# Patient Record
Sex: FEMALE | Race: BLACK OR AFRICAN AMERICAN | NOT HISPANIC OR LATINO | Employment: FULL TIME | URBAN - METROPOLITAN AREA
[De-identification: names, ages, dates, MRNs, and addresses within clinical notes are randomized per-mention and may not be internally consistent; named-entity substitution may affect disease eponyms.]

---

## 2017-01-23 ENCOUNTER — TELEPHONE (OUTPATIENT)
Dept: OBSTETRICS AND GYNECOLOGY | Facility: CLINIC | Age: 37
End: 2017-01-23

## 2017-01-23 NOTE — TELEPHONE ENCOUNTER
----- Message from Traci Byers sent at 1/23/2017  2:08 PM CST -----  Contact: Patient  X _1st Request  _  2nd Request  _  3rd Request    Who:LOUIE MCQUEEN [[id number]]    Why:Patient call to see if she can get her appointment switched to tomorrow     What Number to Call Back:Patient can reached at [phone] she will be there until 4:30 if not call [phone]    When to Expect a call back: (Before the end of the day)   -- if call after 3:00 call back will be tomorrow.

## 2017-01-25 ENCOUNTER — OFFICE VISIT (OUTPATIENT)
Dept: OBSTETRICS AND GYNECOLOGY | Facility: CLINIC | Age: 37
End: 2017-01-25
Payer: COMMERCIAL

## 2017-01-25 VITALS
SYSTOLIC BLOOD PRESSURE: 144 MMHG | BODY MASS INDEX: 40.66 KG/M2 | DIASTOLIC BLOOD PRESSURE: 102 MMHG | WEIGHT: 259.06 LBS | HEIGHT: 67 IN

## 2017-01-25 DIAGNOSIS — E66.9 OBESITY, UNSPECIFIED OBESITY SEVERITY, UNSPECIFIED OBESITY TYPE: ICD-10-CM

## 2017-01-25 DIAGNOSIS — E11.9 TYPE 2 DIABETES MELLITUS WITHOUT COMPLICATION, WITH LONG-TERM CURRENT USE OF INSULIN: ICD-10-CM

## 2017-01-25 DIAGNOSIS — I10 ESSENTIAL HYPERTENSION: Primary | ICD-10-CM

## 2017-01-25 DIAGNOSIS — N92.0 MENORRHAGIA WITH REGULAR CYCLE: ICD-10-CM

## 2017-01-25 DIAGNOSIS — Z79.4 TYPE 2 DIABETES MELLITUS WITHOUT COMPLICATION, WITH LONG-TERM CURRENT USE OF INSULIN: ICD-10-CM

## 2017-01-25 PROCEDURE — 99999 PR PBB SHADOW E&M-EST. PATIENT-LVL II: CPT | Mod: PBBFAC,,, | Performed by: OBSTETRICS & GYNECOLOGY

## 2017-01-25 PROCEDURE — 99212 OFFICE O/P EST SF 10 MIN: CPT | Mod: PBBFAC | Performed by: OBSTETRICS & GYNECOLOGY

## 2017-01-25 PROCEDURE — 99212 OFFICE O/P EST SF 10 MIN: CPT | Mod: S$GLB,,, | Performed by: OBSTETRICS & GYNECOLOGY

## 2017-01-25 RX ORDER — MEDROXYPROGESTERONE ACETATE 10 MG/1
10 TABLET ORAL DAILY
Qty: 36 TABLET | Refills: 1 | Status: SHIPPED | OUTPATIENT
Start: 2017-01-25 | End: 2017-08-22

## 2017-01-25 NOTE — PROGRESS NOTES
HISTORY OF PRESENT ILLNESS:    Dee Felton is a 36 y.o. female, , No LMP recorded.,  presents for a follow up.     Patient reports long history of menorrhagia, last seen here by use in . Patient with history of uterine fibroids. Seen at outside MD office, no intervention recalled.   Patient reports 7 day cycles once a month using 6-8 pads per day with clotting. No BTB, some dysmenorrhea.   Patient also with intermittent vaginal discharge, self treated for yeast several times this year.   , considering in vitro in the future. Seen by NALLELY. Patient with history of BTL.      FINAL PATHOLOGIC DIAGNOSIS  ENDOMETRIUM (BIOPSY):  No hyperplasia, no malignancy  Proliferative phase endometrium     Narrative   Comparison: Pelvic ultrasound 8/17/10.      Technique: Transabdominal and transvaginal sonographic evaluation of pelvic organs was performed including grayscale, color flow and spectral technique.    Findings:    The uterus measures 10.0 x 7.8 x 7.3 cm and the endometrial stripe is uniform in thickness measuring 2 mm.  There is a prominent pedunculated, heterogeneous solid mass within the posterior uterine wall compatible with a subserosal uterine fibroid, measuring 6.0 x 5.0 x 5.7 cm, increased in size from 4.2 x 2.5 x 2.7 centimeters on prior study.  There is an additional 2.1 cm subserosal fibroid within the anterior wall.    The right ovary measures 3.6 x 1.9 x 3.0 cm with normal blood flow.  There is a 1.7 cm cyst within the right ovary.    The left ovary measures 2.9 x 1.9 x 2.4 cm with normal blood flow.    There is no free pelvic fluid.   Impression       Enlarging 6.0 cm subserosal fibroid within the posterior uterine wall.  Additional 2.1 cm subserosal fibroid within the anterior wall.    Notification system activated.  ______________________________________     Electronically signed by resident: LAURE MEDINA MD  Date: 10/17/16  Time: 09:08               Past Medical History  "  Diagnosis Date    Anemia     Diabetes mellitus     Hypertension     Sickle cell trait        Past Surgical History   Procedure Laterality Date    Tubal ligation       MEDICATIONS AND ALLERGIES:      Current Outpatient Prescriptions:     amlodipine-benazepril 10-20mg (LOTREL) 10-20 mg per capsule, , Disp: , Rfl:     glimepiride (AMARYL) 4 MG tablet, , Disp: , Rfl:     hydrochlorothiazide (HYDRODIURIL) 25 MG tablet, , Disp: , Rfl:     metformin (GLUCOPHAGE) 500 MG tablet, , Disp: , Rfl:     PEN NEEDLE 31 gauge x 1/4" Ndle, , Disp: , Rfl:     Review of patient's allergies indicates:   Allergen Reactions    Pcn [penicillins] Hives       Family History   Problem Relation Age of Onset    Diabetes Paternal Grandmother     Diabetes Father     Diabetes Mother     Hypertension Mother        Social History     Social History    Marital status:      Spouse name: N/A    Number of children: N/A    Years of education: N/A     Occupational History    Not on file.     Social History Main Topics    Smoking status: Never Smoker    Smokeless tobacco: Not on file    Alcohol use Yes    Drug use: No    Sexual activity: Yes     Partners: Male     Birth control/ protection: None     Other Topics Concern    Not on file     Social History Narrative       COMPREHENSIVE GYN HISTORY:  PAP History: Denies abnormal Paps.  Infection History: Denies STDs. Denies PID.  Benign History: Denies uterine fibroids. Denies ovarian cysts. Denies endometriosis. Denies other conditions.  Cancer History: Denies cervical cancer. Denies uterine cancer or hyperplasia. Denies ovarian cancer. Denies vulvar cancer or pre-cancer. Denies vaginal cancer or pre-cancer. Denies breast cancer. Denies colon cancer.  Sexual Activity History: Reports currently being sexually active  Menstrual History: Monthly. Mod then light flow.   Dysmenorrhea History: Reports mild dysmenorrhea.     ROS:  GENERAL: No weight changes. No swelling. No fatigue. " "No fever.  CARDIOVASCULAR: No chest pain. No shortness of breath. No leg cramps.   NEUROLOGICAL: No headaches. No vision changes.  BREASTS: No pain. No lumps. No discharge.  ABDOMEN: No pain. No nausea. No vomiting. No diarrhea. No constipation.  REPRODUCTIVE: No abnormal bleeding.   VULVA: No pain. No lesions. No itching.  VAGINA: No relaxation. No itching. No odor. No discharge. No lesions.  URINARY: No incontinence. No nocturia. No frequency. No dysuria.    Visit Vitals    BP (!) 144/102    Ht 5' 7" (1.702 m)    Wt 117.5 kg (259 lb 0.7 oz)    BMI 40.57 kg/m2       PE:  APPEARANCE: Well nourished, well developed, in no acute distress.  AFFECT: WNL, alert and oriented x 3.  Deferred    DIAGNOSIS:  1. Essential hypertension    2. Type 2 diabetes mellitus without complication, with long-term current use of insulin    3. Obesity, unspecified obesity severity, unspecified obesity type      Patient counseled in detail on options available for menorrhagia and uterine fibroids. Medical therapy and surgical options discussed in detail.  Will proceed with cyclic Provera at this time.      PLAN: Strict precautions given.   RTC in 3-4 months      "

## 2017-03-01 ENCOUNTER — OFFICE VISIT (OUTPATIENT)
Dept: OBSTETRICS AND GYNECOLOGY | Facility: CLINIC | Age: 37
End: 2017-03-01
Payer: MEDICAID

## 2017-03-01 VITALS
HEIGHT: 67 IN | SYSTOLIC BLOOD PRESSURE: 160 MMHG | WEIGHT: 259 LBS | DIASTOLIC BLOOD PRESSURE: 110 MMHG | BODY MASS INDEX: 40.65 KG/M2

## 2017-03-01 DIAGNOSIS — Z01.30 BP CHECK: ICD-10-CM

## 2017-03-01 DIAGNOSIS — I10 ESSENTIAL HYPERTENSION: Primary | ICD-10-CM

## 2017-03-01 PROCEDURE — 99999 PR PBB SHADOW E&M-EST. PATIENT-LVL III: CPT | Mod: PBBFAC,,, | Performed by: NURSE PRACTITIONER

## 2017-03-01 PROCEDURE — 99213 OFFICE O/P EST LOW 20 MIN: CPT | Mod: PBBFAC | Performed by: NURSE PRACTITIONER

## 2017-03-01 PROCEDURE — 99213 OFFICE O/P EST LOW 20 MIN: CPT | Mod: S$PBB,,, | Performed by: NURSE PRACTITIONER

## 2017-03-01 NOTE — PROGRESS NOTES
CC: BP check    HPI: Pt is a 36 y.o.  female who presents for a BP check as a f/u from her visit with Dr. Moscoso on 1/25/17. BP at last visit was 144/102-pt had denied having any elevated BPs before. Pt was started on HCTZ 25 mg. Pt states she did not take her medicine today. Denies chest pain, SOB, and headaches. No other problems. Pt does have a PCP at [facility] and has an appt with her for next week.     ROS:  GENERAL: Feeling well overall. Denies fever or chills.   SKIN: Denies rash or lesions.   HEAD: Denies head injury or headache.   NODES: Denies enlarged lymph nodes.   CHEST: Denies chest pain or shortness of breath.   CARDIOVASCULAR: Denies palpitations or left sided chest pain.   ABDOMEN: No abdominal pain, constipation, diarrhea, nausea, vomiting or rectal bleeding.   URINARY: No dysuria, hematuria, or burning on urination.  REPRODUCTIVE: See HPI.   BREASTS: Denies pain, lumps, or nipple discharge.   HEMATOLOGIC: No easy bruisability or excessive bleeding.   MUSCULOSKELETAL: Denies joint pain or swelling.   NEUROLOGIC: Denies syncope or weakness.   PSYCHIATRIC: Denies depression, anxiety or mood swings.    PE:   APPEARANCE: Well nourished, well developed, Black or  female in no acute distress.  PELVIC: deferred    Diagnosis:  1. Essential hypertension    2. BP check        Plan:   BP today 160/110-encouraged to take her medicine today.   Told she needs to f/u with PCP to get HTN under control-pt has appt scheduled for next week  BP precautions-go to ED with headaches, vision changes, and chest pain.     Follow-up with PCP for BP

## 2017-04-20 ENCOUNTER — TELEPHONE (OUTPATIENT)
Dept: OBSTETRICS AND GYNECOLOGY | Facility: CLINIC | Age: 37
End: 2017-04-20

## 2017-04-20 NOTE — TELEPHONE ENCOUNTER
----- Message from Radha Rodrigues sent at 4/20/2017 12:34 PM CDT -----  Contact: Self  X  1st Request  _  2nd Request  _  3rd Request        Who:  LOUIE MCQUEEN [[id number]]    Why: Pt is calling to discuss her next steps with having surgery and would like to know if she can be seen sooner than June.  Pt was advised that there wasn't anything that I could get her scheduled for.  Please contact pt to further discuss and advise.    What Number to Call Back: [phone]    When to Expect a call back: (Before the end of the day)   -- if the call is after 12:00, the call back will be tomorrow.

## 2017-06-21 ENCOUNTER — LAB VISIT (OUTPATIENT)
Dept: LAB | Facility: OTHER | Age: 37
End: 2017-06-21
Attending: OBSTETRICS & GYNECOLOGY
Payer: MEDICAID

## 2017-06-21 ENCOUNTER — OFFICE VISIT (OUTPATIENT)
Dept: OBSTETRICS AND GYNECOLOGY | Facility: CLINIC | Age: 37
End: 2017-06-21
Payer: MEDICAID

## 2017-06-21 VITALS
BODY MASS INDEX: 38.76 KG/M2 | DIASTOLIC BLOOD PRESSURE: 100 MMHG | WEIGHT: 246.94 LBS | HEIGHT: 67 IN | SYSTOLIC BLOOD PRESSURE: 130 MMHG

## 2017-06-21 DIAGNOSIS — D25.9 UTERINE LEIOMYOMA, UNSPECIFIED LOCATION: ICD-10-CM

## 2017-06-21 DIAGNOSIS — E66.9 OBESITY, UNSPECIFIED OBESITY SEVERITY, UNSPECIFIED OBESITY TYPE: ICD-10-CM

## 2017-06-21 DIAGNOSIS — N89.8 VAGINAL IRRITATION: ICD-10-CM

## 2017-06-21 DIAGNOSIS — Z79.4 TYPE 2 DIABETES MELLITUS WITHOUT COMPLICATION, WITH LONG-TERM CURRENT USE OF INSULIN: ICD-10-CM

## 2017-06-21 DIAGNOSIS — N92.0 MENORRHAGIA WITH REGULAR CYCLE: ICD-10-CM

## 2017-06-21 DIAGNOSIS — B37.2 YEAST INFECTION OF THE SKIN: ICD-10-CM

## 2017-06-21 DIAGNOSIS — E11.9 TYPE 2 DIABETES MELLITUS WITHOUT COMPLICATION, WITH LONG-TERM CURRENT USE OF INSULIN: ICD-10-CM

## 2017-06-21 DIAGNOSIS — Z86.2 HISTORY OF ANEMIA: ICD-10-CM

## 2017-06-21 DIAGNOSIS — I10 ESSENTIAL HYPERTENSION: Primary | ICD-10-CM

## 2017-06-21 DIAGNOSIS — Z01.419 ENCOUNTER FOR GYNECOLOGICAL EXAMINATION WITHOUT ABNORMAL FINDING: ICD-10-CM

## 2017-06-21 LAB
ANISOCYTOSIS BLD QL SMEAR: ABNORMAL
BASOPHILS # BLD AUTO: 0.01 K/UL
BASOPHILS NFR BLD: 0.2 %
DIFFERENTIAL METHOD: ABNORMAL
EOSINOPHIL # BLD AUTO: 0.1 K/UL
EOSINOPHIL NFR BLD: 0.9 %
ERYTHROCYTE [DISTWIDTH] IN BLOOD BY AUTOMATED COUNT: 18.2 %
ESTIMATED AVG GLUCOSE: 283 MG/DL
HBA1C MFR BLD HPLC: 11.5 %
HCT VFR BLD AUTO: 25.4 %
HGB BLD-MCNC: 7.8 G/DL
HYPOCHROMIA BLD QL SMEAR: ABNORMAL
LYMPHOCYTES # BLD AUTO: 2 K/UL
LYMPHOCYTES NFR BLD: 31 %
MCH RBC QN AUTO: 19.6 PG
MCHC RBC AUTO-ENTMCNC: 30.7 %
MCV RBC AUTO: 64 FL
MONOCYTES # BLD AUTO: 0.5 K/UL
MONOCYTES NFR BLD: 7.9 %
NEUTROPHILS # BLD AUTO: 3.9 K/UL
NEUTROPHILS NFR BLD: 60 %
OVALOCYTES BLD QL SMEAR: ABNORMAL
PLATELET # BLD AUTO: 482 K/UL
PLATELET BLD QL SMEAR: ABNORMAL
PMV BLD AUTO: 9.5 FL
POIKILOCYTOSIS BLD QL SMEAR: SLIGHT
POLYCHROMASIA BLD QL SMEAR: ABNORMAL
RBC # BLD AUTO: 3.97 M/UL
SCHISTOCYTES BLD QL SMEAR: PRESENT
T4 FREE SERPL-MCNC: 0.94 NG/DL
TARGETS BLD QL SMEAR: ABNORMAL
TSH SERPL DL<=0.005 MIU/L-ACNC: 1.1 UIU/ML
WBC # BLD AUTO: 6.48 K/UL

## 2017-06-21 PROCEDURE — 4010F ACE/ARB THERAPY RXD/TAKEN: CPT | Mod: ,,, | Performed by: OBSTETRICS & GYNECOLOGY

## 2017-06-21 PROCEDURE — 85025 COMPLETE CBC W/AUTO DIFF WBC: CPT

## 2017-06-21 PROCEDURE — 84439 ASSAY OF FREE THYROXINE: CPT

## 2017-06-21 PROCEDURE — 99999 PR PBB SHADOW E&M-EST. PATIENT-LVL III: CPT | Mod: PBBFAC,,, | Performed by: OBSTETRICS & GYNECOLOGY

## 2017-06-21 PROCEDURE — 36415 COLL VENOUS BLD VENIPUNCTURE: CPT

## 2017-06-21 PROCEDURE — 99212 OFFICE O/P EST SF 10 MIN: CPT | Mod: S$PBB,,, | Performed by: OBSTETRICS & GYNECOLOGY

## 2017-06-21 PROCEDURE — 83036 HEMOGLOBIN GLYCOSYLATED A1C: CPT

## 2017-06-21 PROCEDURE — 84443 ASSAY THYROID STIM HORMONE: CPT

## 2017-06-21 RX ORDER — NYSTATIN 100000 [USP'U]/G
POWDER TOPICAL
Qty: 56.7 G | Refills: 0 | Status: SHIPPED | OUTPATIENT
Start: 2017-06-21 | End: 2017-08-22

## 2017-06-21 RX ORDER — NYSTATIN 100000 U/G
CREAM TOPICAL 2 TIMES DAILY
Qty: 1 TUBE | Refills: 1 | Status: SHIPPED | OUTPATIENT
Start: 2017-06-21 | End: 2017-07-05

## 2017-06-21 NOTE — PATIENT INSTRUCTIONS
Dr. Bernadette No-May   [facility]     [facility], located on the [facility], provides primary and preventive health care for children, adults and seniors.       We offer convenient access to several health services located under one roof, including:   [facility]   Dental   Behavioral health  Hours of operation:   Monday - Friday, 8 a.m. to 5 p.m.  [facility].  Call today for an appointment: [phone]

## 2017-06-21 NOTE — PROGRESS NOTES
HISTORY OF PRESENT ILLNESS:    Dee Felton is a 36 y.o. female  Patient's last menstrual period was 2017. presents today  For follow up.     Patient reports long history of menorrhagia, last seen here by use in . Patient with history of uterine fibroids. Seen at outside MD office, no intervention recalled.   Patient reports 7 day cycles once a month using 6-8 pads per day with clotting. No BTB, some dysmenorrhea.   Patient also with intermittent vaginal discharge, self treated for yeast several times this year.   , considering in vitro in the future. Seen by NALLELY. Patient with history of BTL.      FINAL PATHOLOGIC DIAGNOSIS  ENDOMETRIUM (BIOPSY):  No hyperplasia, no malignancy  Proliferative phase endometrium     Narrative   Comparison: Pelvic ultrasound 8/17/10.      Technique: Transabdominal and transvaginal sonographic evaluation of pelvic organs was performed including grayscale, color flow and spectral technique.    Findings:    The uterus measures 10.0 x 7.8 x 7.3 cm and the endometrial stripe is uniform in thickness measuring 2 mm.  There is a prominent pedunculated, heterogeneous solid mass within the posterior uterine wall compatible with a subserosal uterine fibroid, measuring 6.0 x 5.0 x 5.7 cm, increased in size from 4.2 x 2.5 x 2.7 centimeters on prior study.  There is an additional 2.1 cm subserosal fibroid within the anterior wall.    The right ovary measures 3.6 x 1.9 x 3.0 cm with normal blood flow.  There is a 1.7 cm cyst within the right ovary.    The left ovary measures 2.9 x 1.9 x 2.4 cm with normal blood flow.    There is no free pelvic fluid.   Impression       Enlarging 6.0 cm subserosal fibroid within the posterior uterine wall.  Additional 2.1 cm subserosal fibroid within the anterior wall.    Notification system activated.  ______________________________________     Electronically signed by resident: LAURE MEDINA MD  Date: 10/17/16  Time: 09:08      "      Patient seen in January, on cyclic Provera since then.   Cycles are still heavy, no improvement.     Past Medical History:   Diagnosis Date    Anemia     Diabetes mellitus     Hypertension     Sickle cell trait        Past Surgical History:   Procedure Laterality Date    TUBAL LIGATION         MEDICATIONS AND ALLERGIES:      Current Outpatient Prescriptions:     amlodipine-benazepril 10-20mg (LOTREL) 10-20 mg per capsule, , Disp: , Rfl:     glimepiride (AMARYL) 4 MG tablet, , Disp: , Rfl:     hydrochlorothiazide (HYDRODIURIL) 25 MG tablet, , Disp: , Rfl:     metformin (GLUCOPHAGE) 500 MG tablet, , Disp: , Rfl:     PEN NEEDLE 31 gauge x 1/4" Ndle, , Disp: , Rfl:     medroxyPROGESTERone (PROVERA) 10 MG tablet, Take 1 tablet (10 mg total) by mouth once daily. Take one pill per day on cycle days # 14-25, Disp: 36 tablet, Rfl: 1    Review of patient's allergies indicates:   Allergen Reactions    Pcn [penicillins] Hives       COMPREHENSIVE GYN HISTORY:  PAP History: Denies abnormal Paps.  Infection History: Denies STDs. Denies PID.  Benign History: Denies uterine fibroids. Denies ovarian cysts. Denies endometriosis. Denies other conditions.  Cancer History: Denies cervical cancer. Denies uterine cancer or hyperplasia. Denies ovarian cancer. Denies vulvar cancer or pre-cancer. Denies vaginal cancer or pre-cancer. Denies breast cancer. Denies colon cancer.  Sexual Activity History: Reports currently being sexually active  Menstrual History: Every 28 days, flows for 4 days. Light flow.  Dysmenorrhea History: Denies dysmenorrhea.    ROS:  GENERAL: No fever or chills.  BREASTS: No pain. No lumps. No discharge.  ABDOMEN: No pain. No nausea. No vomiting. No diarrhea. No constipation.  REPRODUCTIVE: No abnormal bleeding.   VULVA: No pain. No lesions. No itching.  VAGINA: No relaxation. No itching. No odor. No discharge. No lesions.  URINARY: No incontinence. No nocturia. No frequency. No " dysuria.    PE:  APPEARANCE: Well nourished, well developed, in no acute distress.  AFFECT: WNL, alert and oriented x 3.  ABDOMEN: Soft. No tenderness or masses. No hepatosplenomegaly. No hernias.  BREASTS: Symmetrical, no skin changes or visible lesions. No palpable masses, nipple discharge bilaterally.  PELVIC: Normal external female genitalia without lesions. Normal hair distribution. Adequate perineal body, normal urethral meatus. Vagina pink and well rugated without lesions or discharge. Cervix pink without lesions, discharge or tenderness. No significant cystocele or rectocele. Bimanual exam shows uterus to be 4-6 weeks size, regular, mobile and nontender. Adnexa without masses or tenderness.    PROCEDURES:    1. Essential hypertension    2. Type 2 diabetes mellitus without complication, with long-term current use of insulin    3. Obesity, unspecified obesity severity, unspecified obesity type    4. Encounter for gynecological examination without abnormal finding        PLAN:         COUNSELING:  The patient was counseled today on:    FOLLOW-UP with me pending test results.

## 2017-06-22 LAB
C TRACH DNA SPEC QL NAA+PROBE: NOT DETECTED
CANDIDA RRNA VAG QL PROBE: NEGATIVE
G VAGINALIS RRNA GENITAL QL PROBE: POSITIVE
N GONORRHOEA DNA SPEC QL NAA+PROBE: NOT DETECTED
T VAGINALIS RRNA GENITAL QL PROBE: NEGATIVE

## 2017-06-27 ENCOUNTER — TELEPHONE (OUTPATIENT)
Dept: OBSTETRICS AND GYNECOLOGY | Facility: CLINIC | Age: 37
End: 2017-06-27

## 2017-06-27 DIAGNOSIS — N76.0 BV (BACTERIAL VAGINOSIS): Primary | ICD-10-CM

## 2017-06-27 DIAGNOSIS — B96.89 BV (BACTERIAL VAGINOSIS): Primary | ICD-10-CM

## 2017-06-27 RX ORDER — METRONIDAZOLE 7.5 MG/G
1 GEL VAGINAL DAILY
Qty: 1 G | Refills: 0 | Status: SHIPPED | OUTPATIENT
Start: 2017-06-27 | End: 2017-07-04

## 2017-08-22 ENCOUNTER — LAB VISIT (OUTPATIENT)
Dept: LAB | Facility: OTHER | Age: 37
End: 2017-08-22
Attending: OBSTETRICS & GYNECOLOGY
Payer: MEDICAID

## 2017-08-22 ENCOUNTER — OFFICE VISIT (OUTPATIENT)
Dept: OBSTETRICS AND GYNECOLOGY | Facility: CLINIC | Age: 37
End: 2017-08-22
Payer: MEDICAID

## 2017-08-22 VITALS
WEIGHT: 247.38 LBS | SYSTOLIC BLOOD PRESSURE: 142 MMHG | DIASTOLIC BLOOD PRESSURE: 94 MMHG | HEIGHT: 67 IN | BODY MASS INDEX: 38.83 KG/M2

## 2017-08-22 DIAGNOSIS — I10 ESSENTIAL HYPERTENSION: ICD-10-CM

## 2017-08-22 DIAGNOSIS — E11.9 TYPE 2 DIABETES MELLITUS WITHOUT COMPLICATION, WITH LONG-TERM CURRENT USE OF INSULIN: ICD-10-CM

## 2017-08-22 DIAGNOSIS — N92.0 MENORRHAGIA WITH REGULAR CYCLE: ICD-10-CM

## 2017-08-22 DIAGNOSIS — Z79.4 TYPE 2 DIABETES MELLITUS WITHOUT COMPLICATION, WITH LONG-TERM CURRENT USE OF INSULIN: ICD-10-CM

## 2017-08-22 DIAGNOSIS — N92.0 MENORRHAGIA WITH REGULAR CYCLE: Primary | ICD-10-CM

## 2017-08-22 DIAGNOSIS — E66.9 OBESITY, UNSPECIFIED OBESITY SEVERITY, UNSPECIFIED OBESITY TYPE: ICD-10-CM

## 2017-08-22 LAB
ANISOCYTOSIS BLD QL SMEAR: SLIGHT
B-HCG UR QL: NEGATIVE
BASOPHILS # BLD AUTO: 0.02 K/UL
BASOPHILS NFR BLD: 0.3 %
CTP QC/QA: YES
DACRYOCYTES BLD QL SMEAR: ABNORMAL
DIFFERENTIAL METHOD: ABNORMAL
EOSINOPHIL # BLD AUTO: 0.1 K/UL
EOSINOPHIL NFR BLD: 1.6 %
ERYTHROCYTE [DISTWIDTH] IN BLOOD BY AUTOMATED COUNT: 18.8 %
GIANT PLATELETS BLD QL SMEAR: PRESENT
HCT VFR BLD AUTO: 25.8 %
HGB BLD-MCNC: 7.8 G/DL
HYPOCHROMIA BLD QL SMEAR: ABNORMAL
LYMPHOCYTES # BLD AUTO: 2.2 K/UL
LYMPHOCYTES NFR BLD: 31.4 %
MCH RBC QN AUTO: 19 PG
MCHC RBC AUTO-ENTMCNC: 30.2 G/DL
MCV RBC AUTO: 63 FL
MONOCYTES # BLD AUTO: 0.4 K/UL
MONOCYTES NFR BLD: 6.3 %
NEUTROPHILS # BLD AUTO: 4.2 K/UL
NEUTROPHILS NFR BLD: 60.4 %
PLATELET # BLD AUTO: 479 K/UL
PLATELET BLD QL SMEAR: ABNORMAL
PMV BLD AUTO: 9.8 FL
POIKILOCYTOSIS BLD QL SMEAR: SLIGHT
POLYCHROMASIA BLD QL SMEAR: ABNORMAL
RBC # BLD AUTO: 4.11 M/UL
SCHISTOCYTES BLD QL SMEAR: ABNORMAL
SCHISTOCYTES BLD QL SMEAR: PRESENT
T4 FREE SERPL-MCNC: 0.98 NG/DL
TSH SERPL DL<=0.005 MIU/L-ACNC: 0.87 UIU/ML
WBC # BLD AUTO: 6.88 K/UL

## 2017-08-22 PROCEDURE — 81025 URINE PREGNANCY TEST: CPT | Mod: PBBFAC | Performed by: OBSTETRICS & GYNECOLOGY

## 2017-08-22 PROCEDURE — 85025 COMPLETE CBC W/AUTO DIFF WBC: CPT

## 2017-08-22 PROCEDURE — 3008F BODY MASS INDEX DOCD: CPT | Mod: ,,, | Performed by: OBSTETRICS & GYNECOLOGY

## 2017-08-22 PROCEDURE — 4010F ACE/ARB THERAPY RXD/TAKEN: CPT | Mod: ,,, | Performed by: OBSTETRICS & GYNECOLOGY

## 2017-08-22 PROCEDURE — 99999 PR PBB SHADOW E&M-EST. PATIENT-LVL II: CPT | Mod: PBBFAC,,, | Performed by: OBSTETRICS & GYNECOLOGY

## 2017-08-22 PROCEDURE — 36415 COLL VENOUS BLD VENIPUNCTURE: CPT

## 2017-08-22 PROCEDURE — 84439 ASSAY OF FREE THYROXINE: CPT

## 2017-08-22 PROCEDURE — 3077F SYST BP >= 140 MM HG: CPT | Mod: ,,, | Performed by: OBSTETRICS & GYNECOLOGY

## 2017-08-22 PROCEDURE — 99213 OFFICE O/P EST LOW 20 MIN: CPT | Mod: S$PBB,,, | Performed by: OBSTETRICS & GYNECOLOGY

## 2017-08-22 PROCEDURE — 3080F DIAST BP >= 90 MM HG: CPT | Mod: ,,, | Performed by: OBSTETRICS & GYNECOLOGY

## 2017-08-22 PROCEDURE — 84443 ASSAY THYROID STIM HORMONE: CPT

## 2017-08-22 PROCEDURE — 3046F HEMOGLOBIN A1C LEVEL >9.0%: CPT | Mod: ,,, | Performed by: OBSTETRICS & GYNECOLOGY

## 2017-08-22 PROCEDURE — 99212 OFFICE O/P EST SF 10 MIN: CPT | Mod: PBBFAC | Performed by: OBSTETRICS & GYNECOLOGY

## 2017-08-22 RX ORDER — METFORMIN HYDROCHLORIDE 500 MG/1
500 TABLET, EXTENDED RELEASE ORAL 2 TIMES DAILY WITH MEALS
COMMUNITY
End: 2018-09-11

## 2017-08-22 RX ORDER — INSULIN GLARGINE 300 [IU]/ML
INJECTION, SOLUTION SUBCUTANEOUS
COMMUNITY
End: 2021-01-28

## 2017-08-22 RX ORDER — GABAPENTIN 300 MG/1
800 CAPSULE ORAL 2 TIMES DAILY
COMMUNITY
End: 2023-02-09

## 2017-08-22 RX ORDER — GLIPIZIDE 10 MG/1
5 TABLET, FILM COATED, EXTENDED RELEASE ORAL
COMMUNITY
End: 2021-01-28

## 2017-08-23 ENCOUNTER — TELEPHONE (OUTPATIENT)
Dept: OBSTETRICS AND GYNECOLOGY | Facility: CLINIC | Age: 37
End: 2017-08-23

## 2017-08-23 NOTE — TELEPHONE ENCOUNTER
----- Message from Norris Lowery sent at 8/23/2017 12:38 PM CDT -----  X_  1st Request  _  2nd Request  _  3rd Request        Who: LOUIE MCQUEEN [[id number]]    Why:  Pt is returning a call from the office regarding her lab work. Please call to discuss.    What Number to Call Back:[phone](until 4:45pm) or [phone]    When to Expect a call back: (With in 24 hours)

## 2017-08-28 NOTE — PROGRESS NOTES
HISTORY OF PRESENT ILLNESS:    Dee Felton is a 36 y.o. female  Patient's last menstrual period was 2017. presents today  For follow up.     Patient reports long history of menorrhagia, last seen here by use in . Patient with history of uterine fibroids. Seen at outside MD office, no intervention recalled.   Patient reports 7 day cycles once a month using 6-8 pads per day with clotting. No BTB, some dysmenorrhea.   Patient also with intermittent vaginal discharge, self treated for yeast several times this year.   , considering in vitro in the future. Seen by NALLELY. Patient with history of BTL.      FINAL PATHOLOGIC DIAGNOSIS  ENDOMETRIUM (BIOPSY):  No hyperplasia, no malignancy  Proliferative phase endometrium     Narrative   Comparison: Pelvic ultrasound 8/17/10.      Technique: Transabdominal and transvaginal sonographic evaluation of pelvic organs was performed including grayscale, color flow and spectral technique.    Findings:    The uterus measures 10.0 x 7.8 x 7.3 cm and the endometrial stripe is uniform in thickness measuring 2 mm.  There is a prominent pedunculated, heterogeneous solid mass within the posterior uterine wall compatible with a subserosal uterine fibroid, measuring 6.0 x 5.0 x 5.7 cm, increased in size from 4.2 x 2.5 x 2.7 centimeters on prior study.  There is an additional 2.1 cm subserosal fibroid within the anterior wall.    The right ovary measures 3.6 x 1.9 x 3.0 cm with normal blood flow.  There is a 1.7 cm cyst within the right ovary.    The left ovary measures 2.9 x 1.9 x 2.4 cm with normal blood flow.    There is no free pelvic fluid.   Impression       Enlarging 6.0 cm subserosal fibroid within the posterior uterine wall.  Additional 2.1 cm subserosal fibroid within the anterior wall.    Notification system activated.  ______________________________________     Electronically signed by resident: LAURE MEDINA MD  Date: 10/17/16  Time: 09:08      "      Patient seen in January, on cyclic Provera since then.   Cycles are still heavy, no improvement.     TODAY:  PATIENT SEEN FOR FOLLOW UP. PATIENT DESIRES MYOMECTOMY.     Past Medical History:   Diagnosis Date    Anemia     Diabetes mellitus     Hypertension     Sickle cell trait        Past Surgical History:   Procedure Laterality Date    TUBAL LIGATION         MEDICATIONS AND ALLERGIES:      Current Outpatient Prescriptions:     amlodipine-benazepril 10-20mg (LOTREL) 10-20 mg per capsule, , Disp: , Rfl:     gabapentin (NEURONTIN) 300 MG capsule, Take 300 mg by mouth 3 (three) times daily., Disp: , Rfl:     glipiZIDE (GLUCOTROL) 10 MG TR24, Take 5 mg by mouth daily with breakfast., Disp: , Rfl:     hydrochlorothiazide (HYDRODIURIL) 25 MG tablet, , Disp: , Rfl:     insulin glargine, TOUJEO, (TOUJEO) 300 unit/mL (1.5 mL) InPn pen, Inject into the skin., Disp: , Rfl:     metformin (GLUCOPHAGE-XR) 500 MG 24 hr tablet, Take 500 mg by mouth 2 (two) times daily with meals., Disp: , Rfl:     PEN NEEDLE 31 gauge x 1/4" Ndle, , Disp: , Rfl:     Review of patient's allergies indicates:   Allergen Reactions    Pcn [penicillins] Hives       COMPREHENSIVE GYN HISTORY:  PAP History: Denies abnormal Paps.  Infection History: Denies STDs. Denies PID.  Benign History: Denies uterine fibroids. Denies ovarian cysts. Denies endometriosis. Denies other conditions.  Cancer History: Denies cervical cancer. Denies uterine cancer or hyperplasia. Denies ovarian cancer. Denies vulvar cancer or pre-cancer. Denies vaginal cancer or pre-cancer. Denies breast cancer. Denies colon cancer.  Sexual Activity History: Reports currently being sexually active  Menstrual History: Every 28 days, flows for 4 days. Light flow.  Dysmenorrhea History: Denies dysmenorrhea.    ROS:  GENERAL: No fever or chills.  BREASTS: No pain. No lumps. No discharge.  ABDOMEN: No pain. No nausea. No vomiting. No diarrhea. No constipation.  REPRODUCTIVE: No " abnormal bleeding.   VULVA: No pain. No lesions. No itching.  VAGINA: No relaxation. No itching. No odor. No discharge. No lesions.  URINARY: No incontinence. No nocturia. No frequency. No dysuria.    PE:  APPEARANCE: Well nourished, well developed, in no acute distress.  AFFECT: WNL, alert and oriented x 3.  DEFERRED        1. Menorrhagia with regular cycle    2. Essential hypertension    3. Type 2 diabetes mellitus without complication, with long-term current use of insulin    4. Obesity, unspecified obesity severity, unspecified obesity type        PLAN:    Orders Placed This Encounter    T4, free    TSH    CBC auto differential    POCT urine pregnancy       FOLLOW-UP with me pending test results.

## 2017-10-13 ENCOUNTER — TELEPHONE (OUTPATIENT)
Dept: OBSTETRICS AND GYNECOLOGY | Facility: CLINIC | Age: 37
End: 2017-10-13

## 2017-10-13 NOTE — TELEPHONE ENCOUNTER
I called the pt to schedule an apt for her to come in to discuss her blood work and the pt states that she started a new job and she will have to call us when she can come in and be seen.

## 2017-10-13 NOTE — TELEPHONE ENCOUNTER
Pt was called and she states that she will call the office back to schedule an apt for a follow up due to she started a new job and she cannot take off right now.

## 2017-10-13 NOTE — TELEPHONE ENCOUNTER
----- Message from Melissa Moscoso MD sent at 8/27/2017 11:56 PM CDT -----  PLEASE DISCUSS SURGERY DATE WITH ME ON NEXT WEEK

## 2018-09-11 ENCOUNTER — OFFICE VISIT (OUTPATIENT)
Dept: OBSTETRICS AND GYNECOLOGY | Facility: CLINIC | Age: 38
End: 2018-09-11
Payer: COMMERCIAL

## 2018-09-11 VITALS
WEIGHT: 233.69 LBS | DIASTOLIC BLOOD PRESSURE: 68 MMHG | SYSTOLIC BLOOD PRESSURE: 122 MMHG | BODY MASS INDEX: 36.68 KG/M2 | HEIGHT: 67 IN

## 2018-09-11 DIAGNOSIS — N90.89 VULVAR IRRITATION: Primary | ICD-10-CM

## 2018-09-11 PROCEDURE — 3078F DIAST BP <80 MM HG: CPT | Mod: CPTII,S$GLB,, | Performed by: OBSTETRICS & GYNECOLOGY

## 2018-09-11 PROCEDURE — 99999 PR PBB SHADOW E&M-EST. PATIENT-LVL III: CPT | Mod: PBBFAC,,, | Performed by: OBSTETRICS & GYNECOLOGY

## 2018-09-11 PROCEDURE — 99214 OFFICE O/P EST MOD 30 MIN: CPT | Mod: S$GLB,,, | Performed by: OBSTETRICS & GYNECOLOGY

## 2018-09-11 PROCEDURE — 3008F BODY MASS INDEX DOCD: CPT | Mod: CPTII,S$GLB,, | Performed by: OBSTETRICS & GYNECOLOGY

## 2018-09-11 PROCEDURE — 3074F SYST BP LT 130 MM HG: CPT | Mod: CPTII,S$GLB,, | Performed by: OBSTETRICS & GYNECOLOGY

## 2018-09-11 RX ORDER — FLUCONAZOLE 150 MG/1
150 TABLET ORAL
Qty: 2 TABLET | Refills: 0 | Status: SHIPPED | OUTPATIENT
Start: 2018-09-11 | End: 2018-09-15

## 2018-09-11 RX ORDER — NYSTATIN AND TRIAMCINOLONE ACETONIDE 100000; 1 [USP'U]/G; MG/G
CREAM TOPICAL
Qty: 30 G | Refills: 1 | Status: SHIPPED | OUTPATIENT
Start: 2018-09-11 | End: 2019-01-09 | Stop reason: SDUPTHER

## 2018-09-11 NOTE — LETTER
September 11, 2018               [facility] [address]  Obstetrics and Gynecology  [address]  [facility] [place] [facility] [address]  Phone: [phone]  Fax: [phone]   September 11, 2018     Patient: Dee Felton   YOB: 1980   Date of Visit: 9/11/2018       To Whom it May Concern:    Dee Felton was seen in my clinic on 9/11/2018. She may return to work on 9/12/2018.    If you have any questions or concerns, please don't hesitate to call.    Sincerely,         Melissa Navarrete MA

## 2018-09-11 NOTE — PROGRESS NOTES
"CC: vulvar irritation     Dee Felton is a 37 y.o. female, , Patient's last menstrual period was 2018 (exact date).,  presents for a follow up visit, for above.     Here today for vulvar irritation. Seen in [facility] ED for some sort of allergic reaction/rash per patient report. Not sure what it was in response to. They gave her abx and steroids. Since this time, she has developed intense vulvar itching, irritation and burning. Tried OTC pads and triple antibiotics on the vulva without relief. Of note, she is a diabetic and states her last A1C was 10.     Past Medical History:   Diagnosis Date    Anemia     Diabetes mellitus     Hypertension     Sickle cell trait        Past Surgical History:   Procedure Laterality Date    TUBAL LIGATION       MEDICATIONS AND ALLERGIES:      Current Outpatient Medications:     amlodipine-benazepril 10-20mg (LOTREL) 10-20 mg per capsule, , Disp: , Rfl:     gabapentin (NEURONTIN) 300 MG capsule, Take 300 mg by mouth 3 (three) times daily., Disp: , Rfl:     glipiZIDE (GLUCOTROL) 10 MG TR24, Take 5 mg by mouth daily with breakfast., Disp: , Rfl:     hydrochlorothiazide (HYDRODIURIL) 25 MG tablet, , Disp: , Rfl:     insulin glargine, TOUJEO, (TOUJEO) 300 unit/mL (1.5 mL) InPn pen, Inject into the skin., Disp: , Rfl:     metformin (GLUCOPHAGE-XR) 500 MG 24 hr tablet, Take 500 mg by mouth 2 (two) times daily with meals., Disp: , Rfl:     PEN NEEDLE 31 gauge x 1/4" Ndle, , Disp: , Rfl:     Review of patient's allergies indicates:   Allergen Reactions    Pcn [penicillins] Hives       Family History   Problem Relation Age of Onset    Diabetes Paternal Grandmother     Diabetes Father     Diabetes Mother     Hypertension Mother        Social History     Socioeconomic History    Marital status:      Spouse name: Not on file    Number of children: Not on file    Years of education: Not on file    Highest education level: Not on file   Social " "Needs    Financial resource strain: Not on file    Food insecurity - worry: Not on file    Food insecurity - inability: Not on file    Transportation needs - medical: Not on file    Transportation needs - non-medical: Not on file   Occupational History    Not on file   Tobacco Use    Smoking status: Never Smoker    Smokeless tobacco: Never Used   Substance and Sexual Activity    Alcohol use: Yes    Drug use: No    Sexual activity: Yes     Partners: Male     Birth control/protection: None, See Surgical Hx     Comment: , tubal   Other Topics Concern    Not on file   Social History Narrative    Not on file     ROS:  GENERA: No fatigue. No fever.  CARDIOVASCULAR: No chest pain. No shortness of breath. No leg cramps.   REPRODUCTIVE: No abnormal bleeding, cycles heavy for first three days  VULVA: Severe itching/irritation.    /68   Ht 5' 7" (1.702 m)   Wt 106 kg (233 lb 11 oz)   LMP 09/02/2018 (Exact Date)   BMI 36.60 kg/m²     PE:  APPEARANCE: Well nourished, well developed, in no acute distress.  AFFECT: WNL, alert and oriented x 3.  Pelvic: There is diffuse erythema on vulva bilaterally with satellite lesions, extends inferiorly to perineum. Some excoriation. No lesions, ulcerations or masses. On internal exam, there is scant vaginal discharge, but some erythema at the introitus. Affirm collected.     Assessment/Plan: 36 yo here with vulvar irritation. Diffuse vulvar erythema on exam. Appearance consistent with yeast infection vs contact dermatitis. May be related to recent course of antibiotics. Could be exacerbated by poorly controlled diabetes.     Rx for diflucan 150 mg po given, she will repeat dose in 72 hours.  Rx send for mycolog to use topically 2-3 times daily.   Affirm sent.   She will fu with her PCP regarding DM management.   I will see her back on Friday to ensure symptom improvement.     Carolina Ellis MD  Obstetrics and Gynecology  [facility]      "

## 2018-09-14 ENCOUNTER — OFFICE VISIT (OUTPATIENT)
Dept: OBSTETRICS AND GYNECOLOGY | Facility: CLINIC | Age: 38
End: 2018-09-14
Payer: COMMERCIAL

## 2018-09-14 VITALS — WEIGHT: 233.69 LBS | BODY MASS INDEX: 36.68 KG/M2 | HEIGHT: 67 IN

## 2018-09-14 DIAGNOSIS — B37.31 VULVAR CANDIDIASIS: Primary | ICD-10-CM

## 2018-09-14 PROCEDURE — 3008F BODY MASS INDEX DOCD: CPT | Mod: CPTII,S$GLB,, | Performed by: OBSTETRICS & GYNECOLOGY

## 2018-09-14 PROCEDURE — 99213 OFFICE O/P EST LOW 20 MIN: CPT | Mod: S$GLB,,, | Performed by: OBSTETRICS & GYNECOLOGY

## 2018-09-14 PROCEDURE — 99999 PR PBB SHADOW E&M-EST. PATIENT-LVL III: CPT | Mod: PBBFAC,,, | Performed by: OBSTETRICS & GYNECOLOGY

## 2018-09-14 NOTE — LETTER
September 14, 2018             [facility] - OB/GYN [address]  Obstetrics and Gynecology  [address]  [place] [address]  Phone: [phone]  Fax: [phone]   September 14, 2018     Patient: Dee Felton   YOB: 1980   Date of Visit: 9/14/2018       To Whom it May Concern:    Dee Felton was seen in my clinic on 9/14/2018. She may return with no restrictions.    If you have any questions or concerns, please don't hesitate to call.    Sincerely,         Melissa Navarrete MA

## 2018-09-18 NOTE — PROGRESS NOTES
"CC: Fu visit, vulvar irritation     Dee Felton is a 37 y.o. female, , Patient's last menstrual period was 2018 (exact date).,  presents for a follow up visit, for above.     Seen in [facility] ED for some sort of allergic reaction/rash per patient report. Not sure what it was in response to. They gave her abx and steroids. Since this time, she has developed intense vulvar itching, irritation and burning. Tried OTC pads and triple antibiotics on the vulva without relief. Of note, she is a diabetic and states her last A1C was 10. I saw her last Friday and exam findings c/w yeast infection. Given Rx for diflucan 150 mg po x 2 doses 72 hours apart and mycolog. Symptoms have improved per patient report. No longer having vulvar irritation/itching, etc.     Of note, patient expressed desire for hysterectomy for definitive management of her fibroids and AUB. Tried mirena IUD previously, but per patient, had associated infection with this. Given rx for TXA previously, but didn't want to do more medication.     Past Medical History:   Diagnosis Date    Anemia     Diabetes mellitus     Hypertension     Sickle cell trait        Past Surgical History:   Procedure Laterality Date    TUBAL LIGATION       MEDICATIONS AND ALLERGIES:      Current Outpatient Medications:     amlodipine-benazepril 10-20mg (LOTREL) 10-20 mg per capsule, , Disp: , Rfl:     gabapentin (NEURONTIN) 300 MG capsule, Take 300 mg by mouth 3 (three) times daily., Disp: , Rfl:     glipiZIDE (GLUCOTROL) 10 MG TR24, Take 5 mg by mouth daily with breakfast., Disp: , Rfl:     hydrochlorothiazide (HYDRODIURIL) 25 MG tablet, , Disp: , Rfl:     insulin glargine, TOUJEO, (TOUJEO) 300 unit/mL (1.5 mL) InPn pen, Inject into the skin., Disp: , Rfl:     nystatin-triamcinolone (MYCOLOG II) cream, Apply to affected area 2 times daily, Disp: 30 g, Rfl: 1    PEN NEEDLE 31 gauge x /" Ndle, , Disp: , Rfl:     Review of patient's allergies " "indicates:   Allergen Reactions    Pcn [penicillins] Hives       Family History   Problem Relation Age of Onset    Diabetes Paternal Grandmother     Diabetes Father     Diabetes Mother     Hypertension Mother        Social History     Socioeconomic History    Marital status:      Spouse name: Not on file    Number of children: Not on file    Years of education: Not on file    Highest education level: Not on file   Social Needs    Financial resource strain: Not on file    Food insecurity - worry: Not on file    Food insecurity - inability: Not on file    Transportation needs - medical: Not on file    Transportation needs - non-medical: Not on file   Occupational History    Not on file   Tobacco Use    Smoking status: Never Smoker    Smokeless tobacco: Never Used   Substance and Sexual Activity    Alcohol use: Yes    Drug use: No    Sexual activity: Yes     Partners: Male     Birth control/protection: None, See Surgical Hx     Comment: , tubal   Other Topics Concern    Not on file   Social History Narrative    Not on file     ROS:  GENERA: No fatigue. No fever.  CARDIOVASCULAR: No chest pain. No shortness of breath. No leg cramps.   REPRODUCTIVE: No abnormal bleeding, cycles heavy for first three days  VULVA: Severe itching/irritation.      Ht 5' 7" (1.702 m)   Wt 106 kg (233 lb 11 oz)   LMP 09/02/2018 (Exact Date)   BMI 36.60 kg/m²     PE:  APPEARANCE: Well nourished, well developed, in no acute distress.  AFFECT: WNL, alert and oriented x 3.  Pelvic: There is very mild erythema on vulva, but significantly improved from prior exam.    Assessment/Plan: 38 yo here for fu vulvar irritation. Treated for severe candidiasis at last visit -- took second dose of diflucan this morning and using topical mycolog as well. Symptoms have resolved and exam has improved dramatically.     Continue mycolog through weekend, may use BID-TID.  I emphasized importance of glycemic control. Last A1C " was 10. She will fu with her PCP.   She is interested in definitive management with hysterectomy for fibroids, AUB. Previously tried/failed medical management. I advised that A1C be < 8 before elective surgery considered. She wants surgery in December. She will fu with me in November. Can also consider lupron for temporary management until surgery. Recommend iron as well.     Carolina Ellis MD  Obstetrics and Gynecology  [facility]

## 2018-10-10 ENCOUNTER — OFFICE VISIT (OUTPATIENT)
Dept: INFECTIOUS DISEASES | Facility: CLINIC | Age: 38
End: 2018-10-10
Payer: COMMERCIAL

## 2018-10-10 VITALS
WEIGHT: 245.38 LBS | DIASTOLIC BLOOD PRESSURE: 91 MMHG | BODY MASS INDEX: 38.51 KG/M2 | TEMPERATURE: 98 F | HEART RATE: 98 BPM | SYSTOLIC BLOOD PRESSURE: 148 MMHG | HEIGHT: 67 IN

## 2018-10-10 DIAGNOSIS — E11.9 TYPE 2 DIABETES MELLITUS WITHOUT COMPLICATION, WITH LONG-TERM CURRENT USE OF INSULIN: ICD-10-CM

## 2018-10-10 DIAGNOSIS — Z22.7 LATENT TUBERCULOSIS BY BLOOD TEST: Primary | ICD-10-CM

## 2018-10-10 DIAGNOSIS — Z79.4 TYPE 2 DIABETES MELLITUS WITHOUT COMPLICATION, WITH LONG-TERM CURRENT USE OF INSULIN: ICD-10-CM

## 2018-10-10 PROCEDURE — 3008F BODY MASS INDEX DOCD: CPT | Mod: CPTII,S$GLB,, | Performed by: INTERNAL MEDICINE

## 2018-10-10 PROCEDURE — 99999 PR PBB SHADOW E&M-EST. PATIENT-LVL III: CPT | Mod: PBBFAC,,, | Performed by: INTERNAL MEDICINE

## 2018-10-10 PROCEDURE — 99203 OFFICE O/P NEW LOW 30 MIN: CPT | Mod: S$GLB,,, | Performed by: INTERNAL MEDICINE

## 2018-10-10 RX ORDER — ISONIAZID 300 MG/1
300 TABLET ORAL DAILY
Qty: 90 TABLET | Refills: 3 | OUTPATIENT
Start: 2018-10-10 | End: 2021-01-28

## 2018-10-10 RX ORDER — LANOLIN ALCOHOL/MO/W.PET/CERES
50 CREAM (GRAM) TOPICAL DAILY
Qty: 90 TABLET | Refills: 3 | Status: SHIPPED | OUTPATIENT
Start: 2018-10-10 | End: 2019-10-10

## 2018-10-10 NOTE — PROGRESS NOTES
Subjective:      Patient ID: Dee Felton is a 37 y.o. female.    Chief Complaint:   Referred by Dr. 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 Has been negative in past. No known exposure to TB at home or work. No symptoms.    Has report of positive test from 9/20/18 and negative CXR done 9/18/18.    She has IDDM and is controlled on insulin and glipizide. No hx of liver disease. Has negative HIV test.    Discussed ADR of INH with her as well as monitoring program.    Review of Systems   Constitution: Negative for chills, decreased appetite, fever, weakness, malaise/fatigue, night sweats, weight gain and weight loss.   HENT: Negative for congestion, ear pain, hearing loss, hoarse voice, sore throat and tinnitus.    Eyes: Negative for blurred vision, redness and visual disturbance.   Cardiovascular: Negative for chest pain, leg swelling and palpitations.   Respiratory: Negative for cough, hemoptysis, shortness of breath and sputum production.    Hematologic/Lymphatic: Negative for adenopathy. Does not bruise/bleed easily.   Skin: Negative for dry skin, itching, rash and suspicious lesions.   Musculoskeletal: Negative for back pain, joint pain, myalgias and neck pain.   Gastrointestinal: Negative for abdominal pain, constipation, diarrhea, heartburn, nausea and vomiting.   Genitourinary: Negative for dysuria, flank pain, frequency, hematuria, hesitancy and urgency.   Neurological: Negative for dizziness, headaches, numbness and paresthesias.   Psychiatric/Behavioral: Negative for depression and memory loss. The patient does not have insomnia and is not nervous/anxious.      Objective:   Physical Exam   Constitutional: She is oriented to person, place, and time. She appears well-developed and well-nourished.  No distress.   Neurological: She is alert and oriented to person, place, and time.   Skin: Skin is warm and dry.   Psychiatric: She has a normal mood and affect. Her behavior is normal. Thought content normal.   Vitals reviewed.    Assessment:       1. Latent tuberculosis by blood test    2. Type 2 diabetes mellitus without complication, with long-term current use of insulin          Plan:        1. ALT today and then monthly during rx   2.  mg daily and B 6 50 mg daily

## 2018-10-10 NOTE — LETTER
October 10, 2018      Bernadette Morales MD  [address]  [place] [address]           [facility] - Infectious Diseases  [address]  [facility] [address]  Phone: [phone]  Fax: [phone]          Patient: Dee Felton   MR Number: [id number]   YOB: 1980   Date of Visit: 10/10/2018       Dear Dr. Bernadette Morales:    Thank you for referring Dee Felton to me for evaluation. Attached you will find relevant portions of my assessment and plan of care.    If you have questions, please do not hesitate to call me. I look forward to following Dee Felton along with you.    Sincerely,    Curtis Moore MD    Enclosure  CC:  No Recipients    If you would like to receive this communication electronically, please contact [email or web address] or [phone] to request more information on [facility] access.    For providers and/or their staff who would like to refer a patient to [facility], please contact us through our one-stop-shop provider referral line, [facility], at [phone].    If you feel you have received this communication in error or would no longer like to receive these types of communications, please e-mail [email or web address]

## 2019-01-09 RX ORDER — NYSTATIN AND TRIAMCINOLONE ACETONIDE 100000; 1 [USP'U]/G; MG/G
CREAM TOPICAL
Qty: 30 G | Refills: 1 | Status: SHIPPED | OUTPATIENT
Start: 2019-01-09 | End: 2020-01-09

## 2020-04-08 ENCOUNTER — TELEPHONE (OUTPATIENT)
Dept: ORTHOPEDICS | Facility: CLINIC | Age: 40
End: 2020-04-08

## 2020-04-08 NOTE — TELEPHONE ENCOUNTER
I called the patient regarding her request for an appointment with Dr. Spence. The patient stated that she had pain in her wrist. She stated that she went to an ER in March and was diagnosed with a wrist sprain and was discharged with a brace.  I informed the patient that Dr. Spence does not see wrist injuries and that I was going to send her message to Dr. Tam's medical staff. The patient verbalized understanding. The patient has no further questions.

## 2020-04-08 NOTE — TELEPHONE ENCOUNTER
----- Message from Alli Matson MA sent at 4/8/2020  1:22 PM CDT -----  Contact: self   Good afternoon,     I received an [employer] message regarding Mrs. Felton.     I called Mrs. Felton regarding her request for an appointment with Dr. Spence. The patient stated that she had pain in her wrist. She stated that she went to an ER in March and was diagnosed with a wrist sprain and was discharged with a brace.  I informed the patient that Dr. Spence does not see wrist injuries and that I was going to send her message to y'all.     Thank you for your help!    Sincerely,   Joshua aMtson MA    ----- Message -----  From: Rasta Washington  Sent: 4/8/2020  12:19 PM CDT  To: Jordy STAFFORD Staff    Pt is asking for a callback regarding a virtual visit please contact pt         Contact info

## 2020-04-08 NOTE — TELEPHONE ENCOUNTER
Returned pt's call. She stated that she was seen at [facility]. Xray was not done. She was told that she has a sprained wrist. Never followed up. I explained to her that with Covid 19 - we're only seeing urgent appts, and told her that she would need an xray prior to visit. Also asked pt about insurance - She stated that she has UMR, and is able to be seen here. Pt will call back to schedule appt when we're back to seeing all pts.

## 2020-05-05 ENCOUNTER — OFFICE VISIT (OUTPATIENT)
Dept: OBSTETRICS AND GYNECOLOGY | Facility: CLINIC | Age: 40
End: 2020-05-05
Payer: COMMERCIAL

## 2020-05-05 ENCOUNTER — TELEPHONE (OUTPATIENT)
Dept: OBSTETRICS AND GYNECOLOGY | Facility: CLINIC | Age: 40
End: 2020-05-05

## 2020-05-05 VITALS
HEIGHT: 67 IN | BODY MASS INDEX: 36.88 KG/M2 | DIASTOLIC BLOOD PRESSURE: 80 MMHG | WEIGHT: 235 LBS | SYSTOLIC BLOOD PRESSURE: 140 MMHG

## 2020-05-05 DIAGNOSIS — N76.0 ACUTE VAGINITIS: Primary | ICD-10-CM

## 2020-05-05 DIAGNOSIS — R10.9 ABDOMINAL PAIN, UNSPECIFIED ABDOMINAL LOCATION: ICD-10-CM

## 2020-05-05 PROCEDURE — 99999 PR PBB SHADOW E&M-EST. PATIENT-LVL III: CPT | Mod: PBBFAC,,, | Performed by: OBSTETRICS & GYNECOLOGY

## 2020-05-05 PROCEDURE — 87801 DETECT AGNT MULT DNA AMPLI: CPT

## 2020-05-05 PROCEDURE — 87661 TRICHOMONAS VAGINALIS AMPLIF: CPT

## 2020-05-05 PROCEDURE — 99999 PR PBB SHADOW E&M-EST. PATIENT-LVL III: ICD-10-PCS | Mod: PBBFAC,,, | Performed by: OBSTETRICS & GYNECOLOGY

## 2020-05-05 PROCEDURE — 87481 CANDIDA DNA AMP PROBE: CPT | Mod: 59

## 2020-05-05 PROCEDURE — 99214 OFFICE O/P EST MOD 30 MIN: CPT | Mod: S$GLB,,, | Performed by: OBSTETRICS & GYNECOLOGY

## 2020-05-05 PROCEDURE — 87086 URINE CULTURE/COLONY COUNT: CPT

## 2020-05-05 PROCEDURE — 99214 PR OFFICE/OUTPT VISIT, EST, LEVL IV, 30-39 MIN: ICD-10-PCS | Mod: S$GLB,,, | Performed by: OBSTETRICS & GYNECOLOGY

## 2020-05-05 RX ORDER — TRIAMTERENE/HYDROCHLOROTHIAZID 37.5-25 MG
TABLET ORAL
Status: ON HOLD | COMMUNITY
Start: 2020-02-08 | End: 2022-08-07

## 2020-05-05 RX ORDER — CETIRIZINE HYDROCHLORIDE 10 MG/1
10 TABLET ORAL
COMMUNITY

## 2020-05-05 RX ORDER — FLUCONAZOLE 150 MG/1
150 TABLET ORAL
Qty: 2 TABLET | Refills: 0 | Status: SHIPPED | OUTPATIENT
Start: 2020-05-05 | End: 2021-01-28

## 2020-05-05 RX ORDER — METFORMIN HYDROCHLORIDE 1000 MG/1
1000 TABLET ORAL
COMMUNITY
End: 2021-01-28

## 2020-05-05 RX ORDER — ATORVASTATIN CALCIUM 20 MG/1
20 TABLET, FILM COATED ORAL
COMMUNITY

## 2020-05-05 RX ORDER — GLIPIZIDE 10 MG/1
10 TABLET ORAL 2 TIMES DAILY
Status: ON HOLD | COMMUNITY
Start: 2020-02-08 | End: 2022-08-07

## 2020-05-05 RX ORDER — INSULIN GLARGINE 300 [IU]/ML
46 INJECTION, SOLUTION SUBCUTANEOUS
COMMUNITY
Start: 2020-01-09

## 2020-05-05 RX ORDER — GLIPIZIDE 5 MG/1
10 TABLET ORAL
COMMUNITY
End: 2021-01-28

## 2020-05-05 RX ORDER — METRONIDAZOLE 500 MG/1
500 TABLET ORAL EVERY 12 HOURS
Qty: 14 TABLET | Refills: 0 | Status: SHIPPED | OUTPATIENT
Start: 2020-05-05 | End: 2020-05-12

## 2020-05-05 RX ORDER — UREA 200 MG/G
CREAM TOPICAL
Status: ON HOLD | COMMUNITY
Start: 2020-05-01 | End: 2022-08-07

## 2020-05-05 RX ORDER — TERBINAFINE HYDROCHLORIDE 250 MG/1
TABLET ORAL
Status: ON HOLD | COMMUNITY
Start: 2020-05-01 | End: 2022-08-07

## 2020-05-05 RX ORDER — OLMESARTAN MEDOXOMIL, AMLODIPINE AND HYDROCHLOROTHIAZIDE TABLET 40/5/25 MG 40; 5; 25 MG/1; MG/1; MG/1
TABLET ORAL
COMMUNITY
End: 2021-01-28

## 2020-05-05 RX ORDER — NYSTATIN AND TRIAMCINOLONE ACETONIDE 100000; 1 [USP'U]/G; MG/G
CREAM TOPICAL
Qty: 30 G | Refills: 1 | OUTPATIENT
Start: 2020-05-05 | End: 2021-01-28

## 2020-05-05 RX ORDER — GABAPENTIN 300 MG/1
300 CAPSULE ORAL
COMMUNITY
End: 2023-02-09

## 2020-05-05 RX ORDER — INSULIN GLARGINE 300 U/ML
INJECTION, SOLUTION SUBCUTANEOUS
COMMUNITY
Start: 2020-04-17 | End: 2021-01-28

## 2020-05-05 NOTE — PROGRESS NOTES
CC: Fu visit    Dee Felton is a 39 y.o. female, , Patient's last menstrual period was 2020 (approximate). presents for a follow up visit, for above.     She is here with vaginal discharge and pain.    She has a history of type 2 diabetes, last A1C on file is from 2017, 11.5.    Has history of uterine fibroids and previously expressed desire for definitive management with hysterectomy. No longer desiring this. Of note, she reports having a recent A1C with her PCP that was 12. She is currently working on glucose control with change in her medications as well as exercise. Walking 2-3 miles per day.    Today, she reports vaginal discharge with some lower abdominal pain as well as external itching. She does have a history of yeast infections, given her poorly controlled type 2 diabetes.    Past Medical History:   Diagnosis Date    Anemia     Diabetes mellitus     Diabetes mellitus, type 2     Hypertension     Sickle cell trait        Past Surgical History:   Procedure Laterality Date    TUBAL LIGATION       MEDICATIONS AND ALLERGIES:      Current Outpatient Medications:     insulin glargine, TOUJEO, (TOUJEO SOLOSTAR U-300 INSULIN) 300 unit/mL (1.5 mL) InPn pen, Inject 46 Units into the skin., Disp: , Rfl:     triamterene-hydrochlorothiazide 37.5-25 mg (MAXZIDE-25) 37.5-25 mg per tablet, TAKE 1 TABLET BY MOUTH ONCE DAILY IN THE MORNING FOR 30 DAYS, Disp: , Rfl:     amlodipine-benazepril 10-20mg (LOTREL) 10-20 mg per capsule, , Disp: , Rfl:     atorvastatin (LIPITOR) 20 MG tablet, Take 20 mg by mouth., Disp: , Rfl:     cetirizine (ZYRTEC) 10 MG tablet, Take 10 mg by mouth., Disp: , Rfl:     fluconazole (DIFLUCAN) 150 MG Tab, Take 1 tablet (150 mg total) by mouth every 72 hours as needed., Disp: 2 tablet, Rfl: 0    gabapentin (NEURONTIN) 300 MG capsule, Take 300 mg by mouth 3 (three) times daily., Disp: , Rfl:     gabapentin (NEURONTIN) 300 MG capsule, Take 300 mg by mouth., Disp: , Rfl:  "    glipiZIDE (GLUCOTROL) 10 MG tablet, Take 10 mg by mouth 2 (two) times daily., Disp: , Rfl:     glipiZIDE (GLUCOTROL) 10 MG TR24, Take 5 mg by mouth daily with breakfast., Disp: , Rfl:     glipiZIDE (GLUCOTROL) 5 MG tablet, Take 10 mg by mouth., Disp: , Rfl:     hydrochlorothiazide (HYDRODIURIL) 25 MG tablet, , Disp: , Rfl:     insulin glargine, TOUJEO, (TOUJEO) 300 unit/mL (1.5 mL) InPn pen, Inject into the skin., Disp: , Rfl:     isoniazid (NYDRAZID) 300 MG Tab, Take 1 tablet (300 mg total) by mouth once daily. One tablet daily for prevention of tuberculosis, Disp: 90 tablet, Rfl: 3    metFORMIN (GLUCOPHAGE) 1000 MG tablet, Take 1,000 mg by mouth., Disp: , Rfl:     metroNIDAZOLE (FLAGYL) 500 MG tablet, Take 1 tablet (500 mg total) by mouth every 12 (twelve) hours. for 7 days, Disp: 14 tablet, Rfl: 0    nystatin-triamcinolone (MYCOLOG II) cream, Apply to affected area 2 times daily, Disp: 30 g, Rfl: 1    olmesartan-amLODIPin-hcthiazid 40-5-25 mg Tab, Take by mouth., Disp: , Rfl:     PEN NEEDLE 31 gauge x 1/4" Ndle, , Disp: , Rfl:     terbinafine HCL (LAMISIL) 250 mg tablet, TK 1 T PO ONCE D FOR 28 DAYS, Disp: , Rfl:     TOUJEO MAX U-300 SOLOSTAR 300 unit/mL (3 mL) InPn, INJECT 35 UNITS SQ QD, Disp: , Rfl:     urea 20 % Crea, SEDRICK EXT AA BID, Disp: , Rfl:     Review of patient's allergies indicates:   Allergen Reactions    Pcn [penicillins] Hives       Family History   Problem Relation Age of Onset    Diabetes Paternal Grandmother     Diabetes Father     Diabetes Mother     Hypertension Mother        Social History     Socioeconomic History    Marital status:      Spouse name: Not on file    Number of children: Not on file    Years of education: Not on file    Highest education level: Not on file   Occupational History    Not on file   Social Needs    Financial resource strain: Not on file    Food insecurity:     Worry: Not on file     Inability: Not on file    Transportation " "needs:     Medical: Not on file     Non-medical: Not on file   Tobacco Use    Smoking status: Never Smoker    Smokeless tobacco: Never Used   Substance and Sexual Activity    Alcohol use: Yes    Drug use: No    Sexual activity: Yes     Partners: Male     Birth control/protection: None, See Surgical Hx     Comment: , tubal   Lifestyle    Physical activity:     Days per week: Not on file     Minutes per session: Not on file    Stress: Not on file   Relationships    Social connections:     Talks on phone: Not on file     Gets together: Not on file     Attends Sikh service: Not on file     Active member of club or organization: Not on file     Attends meetings of clubs or organizations: Not on file     Relationship status: Not on file   Other Topics Concern    Not on file   Social History Narrative    Not on file     ROS:  GENERA: No fatigue. No fever.  CARDIOVASCULAR: No chest pain. No shortness of breath. No leg cramps.   REPRODUCTIVE: No abnormal bleeding, cycles heavy for first three days  VULVA: see HPI      BP (!) 140/80   Ht 5' 7" (1.702 m)   Wt 106.6 kg (235 lb)   LMP 04/21/2020 (Approximate)   BMI 36.81 kg/m²   PE:  APPEARANCE: Well nourished, well developed, in no acute distress.  AFFECT: WNL, alert and oriented x 3.  Pelvic: There is irritation/excortion of the vulva - mostly on the right, some on the left - extends 2-3 cm laterally on the right. On speculum exam, there is thin grey/white discharge. Normal appearing cervix and vaginal mucosa.     Assessment/Plan: 38 yo here with lower abdominal pain and vaginitis symptoms. Most consistent with fungal/candida infection.    Acute vaginitis  -     Vaginosis Screen by DNA Probe    Abdominal pain, unspecified abdominal location  -     Urine culture    Other orders  -     fluconazole (DIFLUCAN) 150 MG Tab; Take 1 tablet (150 mg total) by mouth every 72 hours as needed.  Dispense: 2 tablet; Refill: 0  -     metroNIDAZOLE (FLAGYL) 500 MG " tablet; Take 1 tablet (500 mg total) by mouth every 12 (twelve) hours. for 7 days  Dispense: 14 tablet; Refill: 0  -     nystatin-triamcinolone (MYCOLOG II) cream; Apply to affected area 2 times daily  Dispense: 30 g; Refill: 1        Will message her with results and recommendations.     Carolina Ellis MD  Obstetrics and Gynecology  [facility]

## 2020-05-05 NOTE — TELEPHONE ENCOUNTER
----- Message from Elsa Willis sent at 5/5/2020 10:15 AM CDT -----  Contact: LOUIE MCQUEEN [[id number]]  Name of Who is Calling: LOUIE MCQUEEN [[id number]]    What is the request in detail: Would like to reschedule today's appointment to a later time. Patient states she had issues with her car this morning. Please contact to further discuss and advise      Can the clinic reply by [facility]: no    What Number to Call Back if not [facility]: [phone]

## 2020-05-06 LAB
BACTERIAL VAGINOSIS DNA: NEGATIVE
CANDIDA GLABRATA DNA: POSITIVE
CANDIDA KRUSEI DNA: NEGATIVE
CANDIDA RRNA VAG QL PROBE: POSITIVE
T VAGINALIS RRNA GENITAL QL PROBE: NEGATIVE

## 2020-05-07 LAB
BACTERIA UR CULT: NORMAL
BACTERIA UR CULT: NORMAL

## 2020-05-14 ENCOUNTER — TELEPHONE (OUTPATIENT)
Dept: ORTHOPEDICS | Facility: CLINIC | Age: 40
End: 2020-05-14

## 2020-05-14 DIAGNOSIS — M79.642 LEFT HAND PAIN: Primary | ICD-10-CM

## 2020-05-14 NOTE — TELEPHONE ENCOUNTER
Spoke c pt. Confirmed she is coming in for L hand pain. XR scheduled 05/15/20. Confirmed appt location & time. Pt expressed understanding & was thankful.

## 2020-05-18 ENCOUNTER — TELEPHONE (OUTPATIENT)
Dept: ORTHOPEDICS | Facility: CLINIC | Age: 40
End: 2020-05-18

## 2020-05-18 NOTE — TELEPHONE ENCOUNTER
Spoke to patient  to remind patient of scheduled 5/19/20 appointment and asked if patient could arrive a few minutes early to find a parking spot due to road construction.

## 2020-05-19 ENCOUNTER — OFFICE VISIT (OUTPATIENT)
Dept: ORTHOPEDICS | Facility: CLINIC | Age: 40
End: 2020-05-19
Payer: COMMERCIAL

## 2020-05-19 VITALS
BODY MASS INDEX: 36.88 KG/M2 | WEIGHT: 235 LBS | DIASTOLIC BLOOD PRESSURE: 86 MMHG | HEART RATE: 91 BPM | HEIGHT: 67 IN | SYSTOLIC BLOOD PRESSURE: 134 MMHG

## 2020-05-19 DIAGNOSIS — M65.4 DE QUERVAIN'S TENOSYNOVITIS, LEFT: Primary | ICD-10-CM

## 2020-05-19 PROCEDURE — 99203 OFFICE O/P NEW LOW 30 MIN: CPT | Mod: 25,S$GLB,, | Performed by: ORTHOPAEDIC SURGERY

## 2020-05-19 PROCEDURE — 99999 PR PBB SHADOW E&M-EST. PATIENT-LVL IV: ICD-10-PCS | Mod: PBBFAC,,, | Performed by: ORTHOPAEDIC SURGERY

## 2020-05-19 PROCEDURE — 99203 PR OFFICE/OUTPT VISIT, NEW, LEVL III, 30-44 MIN: ICD-10-PCS | Mod: 25,S$GLB,, | Performed by: ORTHOPAEDIC SURGERY

## 2020-05-19 PROCEDURE — 76942 ECHO GUIDE FOR BIOPSY: CPT | Mod: 26,S$GLB,, | Performed by: ORTHOPAEDIC SURGERY

## 2020-05-19 PROCEDURE — 20550 TENDON SHEATH: ICD-10-PCS | Mod: LT,S$GLB,, | Performed by: ORTHOPAEDIC SURGERY

## 2020-05-19 PROCEDURE — 20550 NJX 1 TENDON SHEATH/LIGAMENT: CPT | Mod: LT,S$GLB,, | Performed by: ORTHOPAEDIC SURGERY

## 2020-05-19 PROCEDURE — 76942 TENDON SHEATH: ICD-10-PCS | Mod: 26,S$GLB,, | Performed by: ORTHOPAEDIC SURGERY

## 2020-05-19 PROCEDURE — 99999 PR PBB SHADOW E&M-EST. PATIENT-LVL IV: CPT | Mod: PBBFAC,,, | Performed by: ORTHOPAEDIC SURGERY

## 2020-05-19 RX ORDER — DEXAMETHASONE SODIUM PHOSPHATE 4 MG/ML
4 INJECTION, SOLUTION INTRA-ARTICULAR; INTRALESIONAL; INTRAMUSCULAR; INTRAVENOUS; SOFT TISSUE
Status: DISCONTINUED | OUTPATIENT
Start: 2020-05-19 | End: 2020-05-19 | Stop reason: HOSPADM

## 2020-05-19 RX ADMIN — DEXAMETHASONE SODIUM PHOSPHATE 4 MG: 4 INJECTION, SOLUTION INTRA-ARTICULAR; INTRALESIONAL; INTRAMUSCULAR; INTRAVENOUS; SOFT TISSUE at 03:05

## 2020-05-19 NOTE — PROGRESS NOTES
I have personally taken the history and examined this patient. I agree with the resident's note as stated above. 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 She has dequervains'. Discussed treatment options. Plan for injection and bracing, and activity modification

## 2020-05-19 NOTE — PROCEDURES
Tendon Sheath  Date/Time: 5/19/2020 3:00 PM  Performed by: Jocelyn Tam MD  Authorized by: Jocelyn Tam MD     Consent Done?:  Yes (Verbal)  Indications:  Pain  Timeout: prior to procedure the correct patient, procedure, and site was verified    Prep: patient was prepped and draped in usual sterile fashion      Local anesthesia used?: Yes    Anesthesia:  Local infiltration  Local anesthetic:  Lidocaine 1% without epinephrine  Location:  Wrist  Site:  L first doral compartment  Ultrasonic guidance for needle placement?: Yes    Needle size:  25 G  Medications:  4 mg dexamethasone 4 mg/mL

## 2020-05-19 NOTE — PROGRESS NOTES
CHIEF COMPLAINT: Left radial sided wrist pain     HISTORY OF PRESENT ILLNESS:  The patient is a 39 y.o.  right hand dominant female who presents for evaluation of her bilateral hand.       History of Trauma: None  Occupation: teacher  Pain Duration: 2 months  Pain Quality: achy  Pain Context:unchanged  Pain Timing: intermittent  Pain Location:radial  Pain Severity: moderate  Aggrevating Factors: activity, motion of thumb  Previous Treatments: brace from her mother  Associated Signs and Symptoms:none      Pain is affecting ADLs.       PAST MEDICAL HISTORY:   Past Medical History:   Diagnosis Date    Anemia     Diabetes mellitus     Diabetes mellitus, type 2     Hypertension     Sickle cell trait      PAST SURGICAL HISTORY:   Past Surgical History:   Procedure Laterality Date    CHOLECYSTECTOMY      TUBAL LIGATION       FAMILY HISTORY:   Family History   Problem Relation Age of Onset    Diabetes Paternal Grandmother     Diabetes Father     Diabetes Mother     Hypertension Mother      SOCIAL HISTORY:   Social History     Socioeconomic History    Marital status:      Spouse name: Not on file    Number of children: Not on file    Years of education: Not on file    Highest education level: Not on file   Occupational History    Not on file   Social Needs    Financial resource strain: Not on file    Food insecurity:     Worry: Not on file     Inability: Not on file    Transportation needs:     Medical: Not on file     Non-medical: Not on file   Tobacco Use    Smoking status: Never Smoker    Smokeless tobacco: Never Used   Substance and Sexual Activity    Alcohol use: Yes    Drug use: No    Sexual activity: Yes     Partners: Male     Birth control/protection: None, See Surgical Hx     Comment: , tubal   Lifestyle    Physical activity:     Days per week: Not on file     Minutes per session: Not on file    Stress: Not on file   Relationships    Social connections:     Talks on phone:  "Not on file     Gets together: Not on file     Attends Confucianism service: Not on file     Active member of club or organization: Not on file     Attends meetings of clubs or organizations: Not on file     Relationship status: Not on file   Other Topics Concern    Not on file   Social History Narrative    Not on file       MEDICATIONS:   Current Outpatient Medications:     amlodipine-benazepril 10-20mg (LOTREL) 10-20 mg per capsule, , Disp: , Rfl:     atorvastatin (LIPITOR) 20 MG tablet, Take 20 mg by mouth., Disp: , Rfl:     cetirizine (ZYRTEC) 10 MG tablet, Take 10 mg by mouth., Disp: , Rfl:     fluconazole (DIFLUCAN) 150 MG Tab, Take 1 tablet (150 mg total) by mouth every 72 hours as needed., Disp: 2 tablet, Rfl: 0    gabapentin (NEURONTIN) 300 MG capsule, Take 300 mg by mouth 3 (three) times daily., Disp: , Rfl:     gabapentin (NEURONTIN) 300 MG capsule, Take 300 mg by mouth., Disp: , Rfl:     glipiZIDE (GLUCOTROL) 10 MG tablet, Take 10 mg by mouth 2 (two) times daily., Disp: , Rfl:     glipiZIDE (GLUCOTROL) 10 MG TR24, Take 5 mg by mouth daily with breakfast., Disp: , Rfl:     glipiZIDE (GLUCOTROL) 5 MG tablet, Take 10 mg by mouth., Disp: , Rfl:     hydrochlorothiazide (HYDRODIURIL) 25 MG tablet, , Disp: , Rfl:     insulin glargine, TOUJEO, (TOUJEO SOLOSTAR U-300 INSULIN) 300 unit/mL (1.5 mL) InPn pen, Inject 46 Units into the skin., Disp: , Rfl:     insulin glargine, TOUJEO, (TOUJEO) 300 unit/mL (1.5 mL) InPn pen, Inject into the skin., Disp: , Rfl:     metFORMIN (GLUCOPHAGE) 1000 MG tablet, Take 1,000 mg by mouth., Disp: , Rfl:     nystatin-triamcinolone (MYCOLOG II) cream, Apply to affected area 2 times daily, Disp: 30 g, Rfl: 1    olmesartan-amLODIPin-hcthiazid 40-5-25 mg Tab, Take by mouth., Disp: , Rfl:     PEN NEEDLE 31 gauge x 1/4" Ndviri, , Disp: , Rfl:     terbinafine HCL (LAMISIL) 250 mg tablet, TK 1 T PO ONCE D FOR 28 DAYS, Disp: , Rfl:     TOUJEO MAX U-300 SOLOSTAR 300 unit/mL (3 " "mL) InPn, INJECT 35 UNITS SQ QD, Disp: , Rfl:     triamterene-hydrochlorothiazide 37.5-25 mg (MAXZIDE-25) 37.5-25 mg per tablet, TAKE 1 TABLET BY MOUTH ONCE DAILY IN THE MORNING FOR 30 DAYS, Disp: , Rfl:     urea 20 % Crea, SEDRICK EXT AA BID, Disp: , Rfl:     isoniazid (NYDRAZID) 300 MG Tab, Take 1 tablet (300 mg total) by mouth once daily. One tablet daily for prevention of tuberculosis, Disp: 90 tablet, Rfl: 3  ALLERGIES:   Review of patient's allergies indicates:   Allergen Reactions    Pcn [penicillins] Hives       VITAL SIGNS: /86   Pulse 91   Ht 5' 7" (1.702 m)   Wt 106.6 kg (235 lb)   LMP 04/21/2020 (Approximate)   BMI 36.81 kg/m²      Review of Systems   Constitution: Negative for chills, fever, weakness and weight loss.   HENT: Negative for congestion.   Cardiovascular: Negative for chest pain and dyspnea on exertion.   Respiratory: Negative for cough and shortness of breath.   Hematologic/Lymphatic: Does not bruise/bleed easily.   Skin: Negative for rash and suspicious lesions.   Musculoskeletal: see HPI  Gastrointestinal: Negative for bowel incontinence, constipation,diarrhea, vomiting.   Genitourinary: Negative for bladder incontinence.   Neurological: Negative for numbness, paresthesias and sensory change.       PHYSICAL EXAMINATION:  Vitals: Afebrile.  Vital signs stable.  General: No acute distress.  Cardio: Regular rate.  Chest: No increased work of breathing.    left Hand Exam    Skin intact  Tenderness over radial styloid  Positive Finkelstein test  Pain with abduction of thumb  Negative CMC grind test    XRAYS:  Hand series left,  were obtained and reviewed  No convincing fracture or dislocation is noted. The osseous structures appear well mineralized and well aligned    ASSESSMENT:   Dee Felton is a 40 yo female with left DeQuervain's tenosynovitis      PLAN:  I have discussed the nature of this problem with the patient today.   - Plan for injection and thumb spica       "

## 2021-01-28 ENCOUNTER — HOSPITAL ENCOUNTER (EMERGENCY)
Facility: OTHER | Age: 41
Discharge: HOME | End: 2021-01-29
Attending: EMERGENCY MEDICINE
Payer: COMMERCIAL

## 2021-01-28 DIAGNOSIS — R73.9 HYPERGLYCEMIA: Primary | ICD-10-CM

## 2021-01-28 DIAGNOSIS — R07.9 CHEST PAIN: ICD-10-CM

## 2021-01-28 DIAGNOSIS — I10 HYPERTENSION, UNCONTROLLED: ICD-10-CM

## 2021-01-28 DIAGNOSIS — H65.93 FLUID LEVEL BEHIND TYMPANIC MEMBRANE OF BOTH EARS: ICD-10-CM

## 2021-01-28 LAB
ALBUMIN SERPL BCP-MCNC: 4 G/DL (ref 3.5–5.2)
ALLENS TEST: ABNORMAL
ALP SERPL-CCNC: 80 U/L (ref 55–135)
ALT SERPL W/O P-5'-P-CCNC: 18 U/L (ref 10–44)
ANION GAP SERPL CALC-SCNC: 10 MMOL/L (ref 8–16)
AST SERPL-CCNC: 29 U/L (ref 10–40)
B-HCG UR QL: NEGATIVE
B-OH-BUTYR BLD STRIP-SCNC: 0 MMOL/L (ref 0–0.5)
BACTERIA #/AREA URNS HPF: NORMAL /HPF
BASOPHILS # BLD AUTO: 0.05 K/UL (ref 0–0.2)
BASOPHILS NFR BLD: 0.7 % (ref 0–1.9)
BILIRUB SERPL-MCNC: 0.2 MG/DL (ref 0.1–1)
BILIRUB UR QL STRIP: NEGATIVE
BUN SERPL-MCNC: 10 MG/DL (ref 6–20)
CALCIUM SERPL-MCNC: 9.6 MG/DL (ref 8.7–10.5)
CHLORIDE SERPL-SCNC: 100 MMOL/L (ref 95–110)
CLARITY UR: CLEAR
CO2 SERPL-SCNC: 25 MMOL/L (ref 23–29)
COLOR UR: YELLOW
CREAT SERPL-MCNC: 1 MG/DL (ref 0.5–1.4)
CTP QC/QA: YES
DELSYS: ABNORMAL
DIFFERENTIAL METHOD: ABNORMAL
EOSINOPHIL # BLD AUTO: 0.1 K/UL (ref 0–0.5)
EOSINOPHIL NFR BLD: 0.8 % (ref 0–8)
ERYTHROCYTE [DISTWIDTH] IN BLOOD BY AUTOMATED COUNT: 21.1 % (ref 11.5–14.5)
EST. GFR  (AFRICAN AMERICAN): >60 ML/MIN/1.73 M^2
EST. GFR  (NON AFRICAN AMERICAN): >60 ML/MIN/1.73 M^2
GLUCOSE SERPL-MCNC: 393 MG/DL (ref 70–110)
GLUCOSE UR QL STRIP: ABNORMAL
HCO3 UR-SCNC: 29.3 MMOL/L (ref 24–28)
HCT VFR BLD AUTO: 25.9 % (ref 37–48.5)
HCT VFR BLD CALC: 28 %PCV (ref 36–54)
HGB BLD-MCNC: 10 G/DL
HGB BLD-MCNC: 7.5 G/DL (ref 12–16)
HGB UR QL STRIP: NEGATIVE
IMM GRANULOCYTES # BLD AUTO: 0.02 K/UL (ref 0–0.04)
IMM GRANULOCYTES NFR BLD AUTO: 0.3 % (ref 0–0.5)
KETONES UR QL STRIP: NEGATIVE
LEUKOCYTE ESTERASE UR QL STRIP: NEGATIVE
LYMPHOCYTES # BLD AUTO: 2.4 K/UL (ref 1–4.8)
LYMPHOCYTES NFR BLD: 31.3 % (ref 18–48)
MCH RBC QN AUTO: 17.5 PG (ref 27–31)
MCHC RBC AUTO-ENTMCNC: 29 G/DL (ref 32–36)
MCV RBC AUTO: 60 FL (ref 82–98)
MICROSCOPIC COMMENT: NORMAL
MODE: ABNORMAL
MONOCYTES # BLD AUTO: 0.5 K/UL (ref 0.3–1)
MONOCYTES NFR BLD: 6.6 % (ref 4–15)
NEUTROPHILS # BLD AUTO: 4.6 K/UL (ref 1.8–7.7)
NEUTROPHILS NFR BLD: 60.3 % (ref 38–73)
NITRITE UR QL STRIP: NEGATIVE
NRBC BLD-RTO: 0 /100 WBC
PCO2 BLDA: 53 MMHG (ref 35–45)
PH SMN: 7.35 [PH] (ref 7.35–7.45)
PH UR STRIP: 8 [PH] (ref 5–8)
PLATELET # BLD AUTO: 505 K/UL (ref 150–350)
PMV BLD AUTO: 10.1 FL (ref 9.2–12.9)
PO2 BLDA: 18 MMHG (ref 40–60)
POC BE: 4 MMOL/L
POC SATURATED O2: 23 % (ref 95–100)
POC TCO2: 31 MMOL/L (ref 24–29)
POCT GLUCOSE: 212 MG/DL (ref 70–110)
POCT GLUCOSE: 303 MG/DL (ref 70–110)
POCT GLUCOSE: 348 MG/DL (ref 70–110)
POCT GLUCOSE: 464 MG/DL (ref 70–110)
POTASSIUM SERPL-SCNC: 4 MMOL/L (ref 3.5–5.1)
PROT SERPL-MCNC: 8.3 G/DL (ref 6–8.4)
PROT UR QL STRIP: NEGATIVE
RBC # BLD AUTO: 4.29 M/UL (ref 4–5.4)
SAMPLE: ABNORMAL
SITE: ABNORMAL
SODIUM SERPL-SCNC: 135 MMOL/L (ref 136–145)
SP GR UR STRIP: 1.01 (ref 1–1.03)
SQUAMOUS #/AREA URNS HPF: 5 /HPF
TROPONIN I SERPL DL<=0.01 NG/ML-MCNC: <0.006 NG/ML (ref 0–0.03)
URN SPEC COLLECT METH UR: ABNORMAL
UROBILINOGEN UR STRIP-ACNC: NEGATIVE EU/DL
WBC # BLD AUTO: 7.55 K/UL (ref 3.9–12.7)
YEAST URNS QL MICRO: NORMAL

## 2021-01-28 PROCEDURE — 63600175 PHARM REV CODE 636 W HCPCS: Performed by: EMERGENCY MEDICINE

## 2021-01-28 PROCEDURE — 93005 ELECTROCARDIOGRAM TRACING: CPT

## 2021-01-28 PROCEDURE — 82962 GLUCOSE BLOOD TEST: CPT

## 2021-01-28 PROCEDURE — 25000003 PHARM REV CODE 250: Performed by: EMERGENCY MEDICINE

## 2021-01-28 PROCEDURE — 96375 TX/PRO/DX INJ NEW DRUG ADDON: CPT

## 2021-01-28 PROCEDURE — 93010 EKG 12-LEAD: ICD-10-PCS | Mod: 76,,, | Performed by: INTERNAL MEDICINE

## 2021-01-28 PROCEDURE — 82010 KETONE BODYS QUAN: CPT

## 2021-01-28 PROCEDURE — 80053 COMPREHEN METABOLIC PANEL: CPT

## 2021-01-28 PROCEDURE — 99285 EMERGENCY DEPT VISIT HI MDM: CPT | Mod: 25

## 2021-01-28 PROCEDURE — 81025 URINE PREGNANCY TEST: CPT | Performed by: EMERGENCY MEDICINE

## 2021-01-28 PROCEDURE — 84484 ASSAY OF TROPONIN QUANT: CPT

## 2021-01-28 PROCEDURE — 96361 HYDRATE IV INFUSION ADD-ON: CPT

## 2021-01-28 PROCEDURE — 85025 COMPLETE CBC W/AUTO DIFF WBC: CPT

## 2021-01-28 PROCEDURE — 96374 THER/PROPH/DIAG INJ IV PUSH: CPT

## 2021-01-28 PROCEDURE — 81000 URINALYSIS NONAUTO W/SCOPE: CPT

## 2021-01-28 PROCEDURE — 93010 ELECTROCARDIOGRAM REPORT: CPT | Mod: ,,, | Performed by: INTERNAL MEDICINE

## 2021-01-28 RX ORDER — METOCLOPRAMIDE HYDROCHLORIDE 5 MG/ML
10 INJECTION INTRAMUSCULAR; INTRAVENOUS
Status: COMPLETED | OUTPATIENT
Start: 2021-01-28 | End: 2021-01-28

## 2021-01-28 RX ORDER — DIPHENHYDRAMINE HYDROCHLORIDE 50 MG/ML
12.5 INJECTION INTRAMUSCULAR; INTRAVENOUS
Status: COMPLETED | OUTPATIENT
Start: 2021-01-28 | End: 2021-01-28

## 2021-01-28 RX ORDER — KETOROLAC TROMETHAMINE 30 MG/ML
15 INJECTION, SOLUTION INTRAMUSCULAR; INTRAVENOUS
Status: COMPLETED | OUTPATIENT
Start: 2021-01-28 | End: 2021-01-28

## 2021-01-28 RX ADMIN — SODIUM CHLORIDE 1000 ML: 0.9 INJECTION, SOLUTION INTRAVENOUS at 08:01

## 2021-01-28 RX ADMIN — SODIUM CHLORIDE 1000 ML: 0.9 INJECTION, SOLUTION INTRAVENOUS at 06:01

## 2021-01-28 RX ADMIN — KETOROLAC TROMETHAMINE 15 MG: 30 INJECTION, SOLUTION INTRAMUSCULAR at 10:01

## 2021-01-28 RX ADMIN — INSULIN HUMAN 6 UNITS: 100 INJECTION, SOLUTION PARENTERAL at 08:01

## 2021-01-28 RX ADMIN — DIPHENHYDRAMINE HYDROCHLORIDE 12.5 MG: 50 INJECTION INTRAMUSCULAR; INTRAVENOUS at 10:01

## 2021-01-28 RX ADMIN — METOCLOPRAMIDE 10 MG: 5 INJECTION, SOLUTION INTRAMUSCULAR; INTRAVENOUS at 10:01

## 2021-01-29 VITALS
SYSTOLIC BLOOD PRESSURE: 169 MMHG | DIASTOLIC BLOOD PRESSURE: 80 MMHG | BODY MASS INDEX: 40.18 KG/M2 | HEART RATE: 85 BPM | WEIGHT: 256 LBS | TEMPERATURE: 98 F | HEIGHT: 67 IN | RESPIRATION RATE: 18 BRPM | OXYGEN SATURATION: 99 %

## 2022-07-20 ENCOUNTER — HOSPITAL ENCOUNTER (EMERGENCY)
Facility: HOSPITAL | Age: 42
Discharge: HOME | End: 2022-07-20
Attending: EMERGENCY MEDICINE

## 2022-07-20 VITALS
OXYGEN SATURATION: 95 % | BODY MASS INDEX: 40.49 KG/M2 | RESPIRATION RATE: 20 BRPM | TEMPERATURE: 99 F | HEIGHT: 67 IN | SYSTOLIC BLOOD PRESSURE: 161 MMHG | WEIGHT: 258 LBS | HEART RATE: 87 BPM | DIASTOLIC BLOOD PRESSURE: 84 MMHG

## 2022-07-20 DIAGNOSIS — S90.31XA CONTUSION OF RIGHT FOOT, INITIAL ENCOUNTER: ICD-10-CM

## 2022-07-20 DIAGNOSIS — M79.671 FOOT PAIN, RIGHT: Primary | ICD-10-CM

## 2022-07-20 PROCEDURE — 99282 PR EMERGENCY DEPT VISIT,LEVEL II: ICD-10-PCS | Mod: ,,, | Performed by: EMERGENCY MEDICINE

## 2022-07-20 PROCEDURE — 99283 EMERGENCY DEPT VISIT LOW MDM: CPT | Mod: 25

## 2022-07-20 PROCEDURE — 99282 EMERGENCY DEPT VISIT SF MDM: CPT | Mod: ,,, | Performed by: EMERGENCY MEDICINE

## 2022-07-20 PROCEDURE — 25000003 PHARM REV CODE 250: Performed by: PHYSICIAN ASSISTANT

## 2022-07-20 RX ORDER — ACETAMINOPHEN 325 MG/1
650 TABLET ORAL
Status: COMPLETED | OUTPATIENT
Start: 2022-07-20 | End: 2022-07-20

## 2022-07-20 RX ADMIN — ACETAMINOPHEN 650 MG: 325 TABLET ORAL at 09:07

## 2022-07-20 NOTE — Clinical Note
"Dee"Eder Felton was seen and treated in our emergency department on 7/20/2022.  She may return to work on 07/21/2022.       If you have any questions or concerns, please don't hesitate to call.      Sirisha Zelaya PA-C"

## 2022-07-20 NOTE — ED TRIAGE NOTES
Patient presents to the ER after a 10lb dumbbell fell off the bed and onto her right foot last night. Patient took tylenol and gabapentin with no relief. Patient endorses numbness and tingling. Patient ambulatory to room.

## 2022-07-20 NOTE — DISCHARGE INSTRUCTIONS
X-rays are negative for fractures or dislocations.   Take ibuprofen 600-800 mg every 6 hours as needed with foods for anti-inflammatory relief.  You can take acetaminophen/tylenol 650 mg every 6 hours or 1000 mg every 8 hours for added relief.  Apply ice to the area for 10-20 minutes every 4 hours. You can apply heat 2 days after for the same duration and frequency.  Follow up with PCP if your symptoms do not improve.   Return to the ER for new or worsening symptoms.

## 2022-07-20 NOTE — ED PROVIDER NOTES
Encounter Date: 7/20/2022       History     Chief Complaint   Patient presents with    Foot Injury     KB weight fell off bed onto rt foot - unsure of weight. Active ROM noted to extremity. Pt ambulatory in triage.      10:06 AM  Patient is a 41-year-old female with a history of DM, HTN, anemia, sickle cell trait who presents to [facility] ED with right foot pain.     States last night a 10 lb dumbbell fell on the top of her right foot.  It fell off from a ledge about 1 foot high off the ground.  She denies any pain at rest.  Has 9/10 pain with ambulation or palpation of the area.  She has tried Tylenol and gabapentin.  Feels paresthesias to the area.  Has not had any difficulty bearing weight or ambulating without assistance.  This is the extent of her medical complaints at this time.        Review of patient's allergies indicates:   Allergen Reactions    Pcn [penicillins] Hives     Past Medical History:   Diagnosis Date    Anemia     Diabetes mellitus     Diabetes mellitus, type 2     Hypertension     Sickle cell trait      Past Surgical History:   Procedure Laterality Date    CHOLECYSTECTOMY      HYSTERECTOMY      TUBAL LIGATION       Family History   Problem Relation Age of Onset    Diabetes Paternal Grandmother     Diabetes Father     Diabetes Mother     Hypertension Mother      Social History     Tobacco Use    Smoking status: Never Smoker    Smokeless tobacco: Never Used   Substance Use Topics    Alcohol use: Yes    Drug use: No     Review of Systems   Constitutional: Negative for chills, diaphoresis and fever.   HENT: Negative for sore throat.    Respiratory: Negative for shortness of breath.    Cardiovascular: Negative for chest pain.   Gastrointestinal: Negative for nausea.   Genitourinary: Negative for dysuria.   Musculoskeletal: Positive for arthralgias. Negative for back pain and myalgias.   Skin: Negative for color change, rash and wound.   Neurological: Negative for weakness.    Hematological: Does not bruise/bleed easily.       Physical Exam     Initial Vitals [07/20/22 0822]   BP Pulse Resp Temp SpO2   (!) 161/84 87 20 98.5 °F (36.9 °C) 95 %      MAP       --         Physical Exam    Vitals reviewed.  Constitutional: She appears well-developed and well-nourished. She is not diaphoretic. She is cooperative.  Non-toxic appearance. She does not have a sickly appearance. She does not appear ill. No distress. Face mask in place.   HENT:   Head: Normocephalic and atraumatic.   Nose: Nose normal.   Mouth/Throat: No trismus in the jaw.   Eyes: Conjunctivae and EOM are normal.   Neck:   Normal range of motion.  Cardiovascular: Normal rate.   Pulses:       Dorsalis pedis pulses are 2+ on the right side and 2+ on the left side.   Pulmonary/Chest: No accessory muscle usage. No tachypnea. No respiratory distress.   Abdominal: She exhibits no distension.   Musculoskeletal:         General: Normal range of motion.      Cervical back: Normal range of motion.      Right ankle: Normal.      Left ankle: Normal.      Right foot: Normal range of motion. Swelling and tenderness present. No deformity, laceration or bony tenderness.      Left foot: Normal.     Neurological: She is alert. She has normal strength.   Skin: Skin is warm and dry. No abrasion, no bruising and no laceration noted. No erythema. No pallor.         ED Course   Procedures  Labs Reviewed   HIV 1 / 2 ANTIBODY   HEPATITIS C ANTIBODY          Imaging Results          X-Ray Foot Complete Right (Final result)  Result time 07/20/22 09:20:14    Final result by Kilo Torrez MD (07/20/22 09:20:14)                 Impression:      Dorsal hindfoot/ankle suspected nonspecific localized soft tissue swelling, without acute displaced fracture-dislocation identified.      Electronically signed by: Kilo Torrez MD  Date:    07/20/2022  Time:    09:20             Narrative:    EXAMINATION:  XR FOOT COMPLETE 3 VIEW RIGHT    CLINICAL HISTORY:  . Pain in  right foot    TECHNIQUE:  AP, lateral, and oblique views of the right foot were performed.    COMPARISON:  None    FINDINGS:  Bones are well mineralized. Overall alignment is within normal limits.  Lisfranc articulation is congruent.  No displaced fracture, dislocation or destructive osseous process.  Minimal degenerative change at the 1st MTP joint dorsal midfoot and hindfoot.  There is soft tissue prominence with subcutaneous stranding along the dorsal aspect of the hindfoot/ankle suspected nonspecific swelling from localized edema/contusion in the setting of recent trauma versus cellulitis.  No subcutaneous emphysema or radiodense retained foreign body.                                 Medications   acetaminophen tablet 650 mg (650 mg Oral Given 7/20/22 0919)     Medical Decision Making:   Initial Assessment:   Patient is a 41-year-old female with a history of DM, HTN, anemia, sickle cell trait who presents to [facility] ED with right foot pain.   Differential Diagnosis:   Includes but is not limited to soft tissue contusion, bony contusion, fractures, dislocations.  Skin intact.  Doubt open fracture if there is a fracture.  Clinical Tests:   Radiological Study: Ordered and Reviewed  ED Management:  Will obtain x-ray, give medication and ice for symptomatic relief and continue monitor.  Patient does not want ibuprofen.  She prefers Tylenol.             ED Course as of 07/20/22 1010   Wed Jul 20, 2022   0835 BP(!): 161/84 [CL]   0835 Temp: 98.5 °F (36.9 °C) [CL]   0835 Pulse: 87 [CL]   0835 Resp: 20 [CL]   0835 SpO2: 95 % [CL]   0936 X-Ray Foot Complete Right  Dorsal hindfoot/ankle suspected nonspecific localized soft tissue swelling, without acute displaced fracture-dislocation identified. [CL]      ED Course User Index  [CL] Sirisha Zelaya PA-C           Will treat for contusion.  Discussed etiology.  Continue OTC medication.  Ice.  Elevate.  Follow up with PCP if symptoms do not improve.  Return to ED precautions given.   All of her questions were answered.  Patient and her partner are comfortable with plan, and patient is stable for discharge.      Clinical Impression:   Final diagnoses:  [M79.671] Foot pain, right (Primary)  [S90.31XA] Contusion of right foot, initial encounter          ED Disposition Condition    Discharge Stable        ED Prescriptions     None        Follow-up Information     Follow up With Specialties Details Why Contact Info    Samantha Sands MD Family Medicine Schedule an appointment as soon as possible for a visit   [address] [facility] [address]  [phone]      [facility] - Emergency Dept Emergency Medicine  If symptoms worsen [address] [facility] [id number] [phone]           Sirisha Zelaya PA-C  07/20/22 1010

## 2022-08-07 ENCOUNTER — HOSPITAL ENCOUNTER (OUTPATIENT)
Facility: HOSPITAL | Age: 42
Discharge: HOME | DRG: 392 | End: 2022-08-08
Attending: INTERNAL MEDICINE | Admitting: INTERNAL MEDICINE
Payer: COMMERCIAL

## 2022-08-07 DIAGNOSIS — R10.9 ABDOMINAL PAIN: ICD-10-CM

## 2022-08-07 DIAGNOSIS — K52.89 STERCORAL COLITIS: Primary | ICD-10-CM

## 2022-08-07 LAB
ALBUMIN SERPL BCP-MCNC: 3.6 G/DL (ref 3.5–5.2)
ALP SERPL-CCNC: 93 U/L (ref 55–135)
ALT SERPL W/O P-5'-P-CCNC: 33 U/L (ref 10–44)
ANION GAP SERPL CALC-SCNC: 12 MMOL/L (ref 8–16)
AST SERPL-CCNC: 37 U/L (ref 10–40)
BASOPHILS # BLD AUTO: 0.01 K/UL (ref 0–0.2)
BASOPHILS NFR BLD: 0.1 % (ref 0–1.9)
BILIRUB SERPL-MCNC: 0.4 MG/DL (ref 0.1–1)
BUN SERPL-MCNC: 12 MG/DL (ref 6–20)
CALCIUM SERPL-MCNC: 9.3 MG/DL (ref 8.7–10.5)
CHLORIDE SERPL-SCNC: 103 MMOL/L (ref 95–110)
CO2 SERPL-SCNC: 23 MMOL/L (ref 23–29)
CREAT SERPL-MCNC: 0.8 MG/DL (ref 0.5–1.4)
DIFFERENTIAL METHOD: ABNORMAL
EOSINOPHIL # BLD AUTO: 0 K/UL (ref 0–0.5)
EOSINOPHIL NFR BLD: 0 % (ref 0–8)
ERYTHROCYTE [DISTWIDTH] IN BLOOD BY AUTOMATED COUNT: 13.5 % (ref 11.5–14.5)
EST. GFR  (NO RACE VARIABLE): >60 ML/MIN/1.73 M^2
GLUCOSE SERPL-MCNC: 225 MG/DL (ref 70–110)
HCT VFR BLD AUTO: 38.2 % (ref 37–48.5)
HGB BLD-MCNC: 13.1 G/DL (ref 12–16)
IMM GRANULOCYTES # BLD AUTO: 0.06 K/UL (ref 0–0.04)
IMM GRANULOCYTES NFR BLD AUTO: 0.7 % (ref 0–0.5)
LYMPHOCYTES # BLD AUTO: 0.8 K/UL (ref 1–4.8)
LYMPHOCYTES NFR BLD: 8.9 % (ref 18–48)
MAGNESIUM SERPL-MCNC: 1.9 MG/DL (ref 1.6–2.6)
MCH RBC QN AUTO: 29.2 PG (ref 27–31)
MCHC RBC AUTO-ENTMCNC: 34.3 G/DL (ref 32–36)
MCV RBC AUTO: 85 FL (ref 82–98)
MONOCYTES # BLD AUTO: 0.1 K/UL (ref 0.3–1)
MONOCYTES NFR BLD: 1.2 % (ref 4–15)
NEUTROPHILS # BLD AUTO: 7.7 K/UL (ref 1.8–7.7)
NEUTROPHILS NFR BLD: 89.1 % (ref 38–73)
NRBC BLD-RTO: 0 /100 WBC
PLATELET # BLD AUTO: 311 K/UL (ref 150–450)
PMV BLD AUTO: 10.9 FL (ref 9.2–12.9)
POCT GLUCOSE: 101 MG/DL (ref 70–110)
POCT GLUCOSE: 146 MG/DL (ref 70–110)
POTASSIUM SERPL-SCNC: 4.1 MMOL/L (ref 3.5–5.1)
PROT SERPL-MCNC: 7.7 G/DL (ref 6–8.4)
RBC # BLD AUTO: 4.49 M/UL (ref 4–5.4)
SODIUM SERPL-SCNC: 138 MMOL/L (ref 136–145)
WBC # BLD AUTO: 8.58 K/UL (ref 3.9–12.7)

## 2022-08-07 PROCEDURE — 25000003 PHARM REV CODE 250: Performed by: NURSE PRACTITIONER

## 2022-08-07 PROCEDURE — 12000002 HC ACUTE/MED SURGE SEMI-PRIVATE ROOM

## 2022-08-07 PROCEDURE — 94761 N-INVAS EAR/PLS OXIMETRY MLT: CPT

## 2022-08-07 PROCEDURE — 99223 PR INITIAL HOSPITAL CARE,LEVL III: ICD-10-PCS | Mod: ,,, | Performed by: INTERNAL MEDICINE

## 2022-08-07 PROCEDURE — 83735 ASSAY OF MAGNESIUM: CPT | Performed by: NURSE PRACTITIONER

## 2022-08-07 PROCEDURE — G0379 DIRECT REFER HOSPITAL OBSERV: HCPCS

## 2022-08-07 PROCEDURE — 36415 COLL VENOUS BLD VENIPUNCTURE: CPT | Performed by: NURSE PRACTITIONER

## 2022-08-07 PROCEDURE — 63600175 PHARM REV CODE 636 W HCPCS: Performed by: INTERNAL MEDICINE

## 2022-08-07 PROCEDURE — 85025 COMPLETE CBC W/AUTO DIFF WBC: CPT | Performed by: NURSE PRACTITIONER

## 2022-08-07 PROCEDURE — 99223 1ST HOSP IP/OBS HIGH 75: CPT | Mod: ,,, | Performed by: INTERNAL MEDICINE

## 2022-08-07 PROCEDURE — 99900035 HC TECH TIME PER 15 MIN (STAT)

## 2022-08-07 PROCEDURE — 25000003 PHARM REV CODE 250: Performed by: INTERNAL MEDICINE

## 2022-08-07 PROCEDURE — 80053 COMPREHEN METABOLIC PANEL: CPT | Performed by: NURSE PRACTITIONER

## 2022-08-07 PROCEDURE — G0378 HOSPITAL OBSERVATION PER HR: HCPCS

## 2022-08-07 PROCEDURE — 96372 THER/PROPH/DIAG INJ SC/IM: CPT | Performed by: INTERNAL MEDICINE

## 2022-08-07 RX ORDER — MUPIROCIN 20 MG/G
OINTMENT TOPICAL 2 TIMES DAILY
Status: DISCONTINUED | OUTPATIENT
Start: 2022-08-07 | End: 2022-08-08 | Stop reason: HOSPADM

## 2022-08-07 RX ORDER — LANOLIN ALCOHOL/MO/W.PET/CERES
800 CREAM (GRAM) TOPICAL
Status: DISCONTINUED | OUTPATIENT
Start: 2022-08-07 | End: 2022-08-08 | Stop reason: HOSPADM

## 2022-08-07 RX ORDER — SIMETHICONE 80 MG
1 TABLET,CHEWABLE ORAL 4 TIMES DAILY PRN
Status: DISCONTINUED | OUTPATIENT
Start: 2022-08-07 | End: 2022-08-08 | Stop reason: HOSPADM

## 2022-08-07 RX ORDER — NALOXONE HCL 0.4 MG/ML
0.02 VIAL (ML) INJECTION
Status: DISCONTINUED | OUTPATIENT
Start: 2022-08-07 | End: 2022-08-08 | Stop reason: HOSPADM

## 2022-08-07 RX ORDER — BENAZEPRIL HYDROCHLORIDE 10 MG/1
20 TABLET ORAL DAILY
Status: DISCONTINUED | OUTPATIENT
Start: 2022-08-07 | End: 2022-08-08 | Stop reason: HOSPADM

## 2022-08-07 RX ORDER — SODIUM,POTASSIUM PHOSPHATES 280-250MG
2 POWDER IN PACKET (EA) ORAL
Status: DISCONTINUED | OUTPATIENT
Start: 2022-08-07 | End: 2022-08-08 | Stop reason: HOSPADM

## 2022-08-07 RX ORDER — ENOXAPARIN SODIUM 100 MG/ML
40 INJECTION SUBCUTANEOUS
Status: DISCONTINUED | OUTPATIENT
Start: 2022-08-07 | End: 2022-08-08 | Stop reason: HOSPADM

## 2022-08-07 RX ORDER — IPRATROPIUM BROMIDE AND ALBUTEROL SULFATE 2.5; .5 MG/3ML; MG/3ML
3 SOLUTION RESPIRATORY (INHALATION) EVERY 4 HOURS PRN
Status: DISCONTINUED | OUTPATIENT
Start: 2022-08-07 | End: 2022-08-08 | Stop reason: HOSPADM

## 2022-08-07 RX ORDER — AMLODIPINE AND BENAZEPRIL HYDROCHLORIDE 10; 20 MG/1; MG/1
1 CAPSULE ORAL DAILY
Status: DISCONTINUED | OUTPATIENT
Start: 2022-08-07 | End: 2022-08-07 | Stop reason: SDUPTHER

## 2022-08-07 RX ORDER — PANTOPRAZOLE SODIUM 40 MG/1
40 TABLET, DELAYED RELEASE ORAL DAILY
Status: DISCONTINUED | OUTPATIENT
Start: 2022-08-07 | End: 2022-08-08 | Stop reason: HOSPADM

## 2022-08-07 RX ORDER — AMLODIPINE BESYLATE 5 MG/1
10 TABLET ORAL DAILY
Status: DISCONTINUED | OUTPATIENT
Start: 2022-08-07 | End: 2022-08-08 | Stop reason: HOSPADM

## 2022-08-07 RX ORDER — GLUCAGON 1 MG
1 KIT INJECTION
Status: DISCONTINUED | OUTPATIENT
Start: 2022-08-07 | End: 2022-08-08 | Stop reason: HOSPADM

## 2022-08-07 RX ORDER — POLYETHYLENE GLYCOL 3350, SODIUM CHLORIDE, SODIUM BICARBONATE, POTASSIUM CHLORIDE 420; 11.2; 5.72; 1.48 G/4L; G/4L; G/4L; G/4L
4000 POWDER, FOR SOLUTION ORAL ONCE
Status: COMPLETED | OUTPATIENT
Start: 2022-08-07 | End: 2022-08-07

## 2022-08-07 RX ORDER — ACETAMINOPHEN 325 MG/1
650 TABLET ORAL EVERY 6 HOURS PRN
Status: DISCONTINUED | OUTPATIENT
Start: 2022-08-07 | End: 2022-08-08 | Stop reason: HOSPADM

## 2022-08-07 RX ORDER — ACETAMINOPHEN 325 MG/1
650 TABLET ORAL EVERY 4 HOURS PRN
Status: DISCONTINUED | OUTPATIENT
Start: 2022-08-07 | End: 2022-08-08 | Stop reason: HOSPADM

## 2022-08-07 RX ORDER — MAG HYDROX/ALUMINUM HYD/SIMETH 200-200-20
30 SUSPENSION, ORAL (FINAL DOSE FORM) ORAL 4 TIMES DAILY PRN
Status: DISCONTINUED | OUTPATIENT
Start: 2022-08-07 | End: 2022-08-08 | Stop reason: HOSPADM

## 2022-08-07 RX ORDER — SODIUM CHLORIDE 0.9 % (FLUSH) 0.9 %
10 SYRINGE (ML) INJECTION EVERY 8 HOURS PRN
Status: DISCONTINUED | OUTPATIENT
Start: 2022-08-07 | End: 2022-08-08 | Stop reason: HOSPADM

## 2022-08-07 RX ORDER — TALC
9 POWDER (GRAM) TOPICAL NIGHTLY PRN
Status: DISCONTINUED | OUTPATIENT
Start: 2022-08-07 | End: 2022-08-08 | Stop reason: HOSPADM

## 2022-08-07 RX ORDER — ONDANSETRON 2 MG/ML
4 INJECTION INTRAMUSCULAR; INTRAVENOUS EVERY 8 HOURS PRN
Status: DISCONTINUED | OUTPATIENT
Start: 2022-08-07 | End: 2022-08-08 | Stop reason: HOSPADM

## 2022-08-07 RX ORDER — IBUPROFEN 200 MG
16 TABLET ORAL
Status: DISCONTINUED | OUTPATIENT
Start: 2022-08-07 | End: 2022-08-08 | Stop reason: HOSPADM

## 2022-08-07 RX ORDER — IBUPROFEN 200 MG
24 TABLET ORAL
Status: DISCONTINUED | OUTPATIENT
Start: 2022-08-07 | End: 2022-08-08 | Stop reason: HOSPADM

## 2022-08-07 RX ORDER — BISACODYL 10 MG
10 SUPPOSITORY, RECTAL RECTAL DAILY
Status: DISCONTINUED | OUTPATIENT
Start: 2022-08-07 | End: 2022-08-08 | Stop reason: HOSPADM

## 2022-08-07 RX ORDER — INSULIN ASPART 100 [IU]/ML
1-10 INJECTION, SOLUTION INTRAVENOUS; SUBCUTANEOUS
Status: DISCONTINUED | OUTPATIENT
Start: 2022-08-07 | End: 2022-08-08 | Stop reason: HOSPADM

## 2022-08-07 RX ORDER — SODIUM CHLORIDE 9 MG/ML
INJECTION, SOLUTION INTRAVENOUS ONCE
Status: COMPLETED | OUTPATIENT
Start: 2022-08-07 | End: 2022-08-07

## 2022-08-07 RX ADMIN — Medication 1 ENEMA: at 05:08

## 2022-08-07 RX ADMIN — ENOXAPARIN SODIUM 40 MG: 100 INJECTION SUBCUTANEOUS at 09:08

## 2022-08-07 RX ADMIN — MUPIROCIN: 20 OINTMENT TOPICAL at 09:08

## 2022-08-07 RX ADMIN — BENAZEPRIL HYDROCHLORIDE 20 MG: 10 TABLET ORAL at 09:08

## 2022-08-07 RX ADMIN — SODIUM CHLORIDE: 0.9 INJECTION, SOLUTION INTRAVENOUS at 06:08

## 2022-08-07 RX ADMIN — POLYETHYLENE GLYCOL 3350, SODIUM CHLORIDE, SODIUM BICARBONATE AND POTASSIUM CHLORIDE WITH LEMON FLAVOR 4000 ML: 420; 11.2; 5.72; 1.48 POWDER, FOR SOLUTION ORAL at 12:08

## 2022-08-07 RX ADMIN — BISACODYL 10 MG: 10 SUPPOSITORY RECTAL at 08:08

## 2022-08-07 RX ADMIN — AMLODIPINE BESYLATE 10 MG: 5 TABLET ORAL at 09:08

## 2022-08-07 RX ADMIN — PANTOPRAZOLE SODIUM 40 MG: 40 TABLET, DELAYED RELEASE ORAL at 09:08

## 2022-08-07 NOTE — PROGRESS NOTES
SHYANN spoke to Laney, ED Registration and informed that pt stated she has insurance with [employer] [place], effective 8/1/22, Swapna stated she will try and verify insurance through [employer] portal.    SHYANN received call back from Laney and checked portal and no insurance for pt listed.  SHYANN gave Laney the group/subgroup # and still no insurance listed.

## 2022-08-07 NOTE — NURSING
Pt arrived to unit.  at bedside. Boateng to gravity. NP notified of patient arrival. VSS. Oriented to room. Awaiting orders.

## 2022-08-07 NOTE — CONSULTS
CC: abdominal pain    HPI 41 y.o. female with history of DMII, diabetes, H pylori, fatty liver, sickle cell trait, chronic constipation, hypertension. who presents for evaluation of diffuse, crampy abdominal pain associated with constipation and imaging findings of stercoral colitis.    She is a transfer from outside facility for evaluation of stercoral colitis. One month ago she changed jobs which required her to change her insurance and she ended up with no coverage for one month. She was taking Linzess but due to cost could not afford medication without insurance.    She has been severely constipated since. She has tried multiple OTC regimens. Eventually went to ED yesterday for abdominal pain and constipation. CT showed stercoral colitis.     She had disimpaction while in ED. CT scan showed findings consistent with stercoral colitis.      Medical records reviewed. Additional history supplemented by nursing.     Past Medical History:   Diagnosis Date    Anemia     Diabetes mellitus     Diabetes mellitus, type 2     Hypertension     Sickle cell trait      Past Surgical History:   Procedure Laterality Date    CHOLECYSTECTOMY      HYSTERECTOMY      TUBAL LIGATION       Social History  Social History     Tobacco Use    Smoking status: Never Smoker    Smokeless tobacco: Never Used   Substance Use Topics    Alcohol use: Yes    Drug use: No     Family History   Problem Relation Age of Onset    Diabetes Paternal Grandmother     Diabetes Father     Diabetes Mother     Hypertension Mother      Review of Systems  General ROS: negative for chills, fever or weight loss  Psychological ROS: negative for hallucination, depression or suicidal ideation  Ophthalmic ROS: negative for blurry vision, photophobia or eye pain  ENT ROS: negative for epistaxis, sore throat or rhinorrhea  Respiratory ROS: no cough, shortness of breath, or wheezing  Cardiovascular ROS: no chest pain or dyspnea on exertion  Gastrointestinal  "ROS: +constipation,+ abdominal pain, change in bowel habits, or black/ bloody stools  Genito-Urinary ROS: no dysuria, trouble voiding, or hematuria  Musculoskeletal ROS: negative for gait disturbance or muscular weakness  Neurological ROS: no syncope or seizures; no ataxia  Dermatological ROS: negative for pruritis, rash and jaundice    Physical Examination  BP (!) 164/87 (BP Location: Right arm, Patient Position: Lying)   Pulse 96   Temp 97.8 °F (36.6 °C) (Oral)   Resp 16   Ht 5' 7" (1.702 m)   Wt 118.6 kg (261 lb 7.5 oz)   LMP 04/21/2020 (Approximate)   SpO2 (!) 94%   Breastfeeding No   BMI 40.95 kg/m²   General appearance: alert, cooperative, no distress  HENT: Normocephalic, atraumatic, neck symmetrical, no nasal discharge   Eyes: conjunctivae/corneas clear, PERRL, EOM's intact  Lungs: no labored breathing, symmetric chest wall expansion bilaterally  Heart: regular rate and rhythm without rub; no displacement of the PMI   Abdomen: soft, mild tenderness; bowel sounds normoactive; no organomegaly  Extremities: extremities symmetric; no clubbing, cyanosis, or edema  Integument: Skin color, texture, turgor normal; no rashes; hair distrubution normal  Neurologic: Alert and oriented X 3, normal strength, normal coordination and gait  Psychiatric: no pressured speech; normal affect; no evidence of impaired cognition     Labs:  Lab Results   Component Value Date    WBC 8.58 08/07/2022    HGB 13.1 08/07/2022    HCT 38.2 08/07/2022    MCV 85 08/07/2022     08/07/2022     CMP  Sodium   Date Value Ref Range Status   08/07/2022 138 136 - 145 mmol/L Final     Potassium   Date Value Ref Range Status   08/07/2022 4.1 3.5 - 5.1 mmol/L Final     Chloride   Date Value Ref Range Status   08/07/2022 103 95 - 110 mmol/L Final     CO2   Date Value Ref Range Status   08/07/2022 23 23 - 29 mmol/L Final     Glucose   Date Value Ref Range Status   08/07/2022 225 (H) 70 - 110 mg/dL Final     BUN   Date Value Ref Range " Status   08/07/2022 12 6 - 20 mg/dL Final     Creatinine   Date Value Ref Range Status   08/07/2022 0.8 0.5 - 1.4 mg/dL Final     Calcium   Date Value Ref Range Status   08/07/2022 9.3 8.7 - 10.5 mg/dL Final     Total Protein   Date Value Ref Range Status   08/07/2022 7.7 6.0 - 8.4 g/dL Final     Albumin   Date Value Ref Range Status   08/07/2022 3.6 3.5 - 5.2 g/dL Final     Total Bilirubin   Date Value Ref Range Status   08/07/2022 0.4 0.1 - 1.0 mg/dL Final     Comment:     For infants and newborns, interpretation of results should be based  on gestational age, weight and in agreement with clinical  observations.    Premature Infant recommended reference ranges:  Up to 24 hours.............<8.0 mg/dL  Up to 48 hours............<12.0 mg/dL  3-5 days..................<15.0 mg/dL  6-29 days.................<15.0 mg/dL       Alkaline Phosphatase   Date Value Ref Range Status   08/07/2022 93 55 - 135 U/L Final     AST   Date Value Ref Range Status   08/07/2022 37 10 - 40 U/L Final     ALT   Date Value Ref Range Status   08/07/2022 33 10 - 44 U/L Final     Anion Gap   Date Value Ref Range Status   08/07/2022 12 8 - 16 mmol/L Final     eGFR if    Date Value Ref Range Status   01/28/2021 >60 >60 mL/min/1.73 m^2 Final     eGFR if non    Date Value Ref Range Status   01/28/2021 >60 >60 mL/min/1.73 m^2 Final     Comment:     Calculation used to obtain the estimated glomerular filtration  rate (eGFR) is the CKD-EPI equation.        Imaging:  CT A/P:    FINDINGS:   Limited evaluation of the lower chest reveals clear lung bases.     Liver, spleen, pancreas, and both adrenal glands appear normal. Gallbladder shows no suspicious finding. No significant intrahepatic or common bile duct dilatation.     Kidneys are symmetric in their noncontrast configuration. There is no hydronephrosis or hydroureter. Urinary bladder is collapsed given the presence of a Boateng catheter.     A large stool ball is noted  within the rectum. There is concomitant mild rectal wall thickening with adjacent minimal inflammation. No evidence of bowel obstruction. Appendix is normal.     Calcifications are evident about the abdominal aorta. No suspicious lymphadenopathy or drainable fluid collections. Vascular collaterals are noted about the anterior/inferior pelvic wall.     Bone windows show no destructive osseous lesions.     IMPRESSION:   Large stool ball located within the rectum with mild rectal wall thickening and adjacent inflammation, concerning for early stercoral colitis. Recommend disimpaction/catharsis.     Assessment:   1. Stercoral colitis  2. Constipation    Plan:   -Clear liquid diet  -Agree with slow intake of Golytely to assist with constipation  -Enemas q4 hours  -Dulcolax 10 mg suppositories nightly    Nikolai Neves MD  [facility]  [address] [place] [address]  Office: [phone]  Fax: [phone]

## 2022-08-07 NOTE — SUBJECTIVE & OBJECTIVE
"Past Medical History:   Diagnosis Date    Anemia     Diabetes mellitus     Diabetes mellitus, type 2     Hypertension     Sickle cell trait        Past Surgical History:   Procedure Laterality Date    CHOLECYSTECTOMY      HYSTERECTOMY      TUBAL LIGATION         Review of patient's allergies indicates:   Allergen Reactions    Pcn [penicillins] Hives    Metformin Nausea And Vomiting       Current Facility-Administered Medications on File Prior to Encounter   Medication    [DISCONTINUED] ciprofloxacin (CIPRO)400mg/200ml D5W IVPB    [DISCONTINUED] metronidazole IVPB     Current Outpatient Medications on File Prior to Encounter   Medication Sig    amlodipine-benazepril 10-20mg (LOTREL) 10-20 mg per capsule     atorvastatin (LIPITOR) 20 MG tablet Take 20 mg by mouth.    cetirizine (ZYRTEC) 10 MG tablet Take 10 mg by mouth.    gabapentin (NEURONTIN) 300 MG capsule Take 800 mg by mouth 2 (two) times daily.    hydrochlorothiazide (HYDRODIURIL) 25 MG tablet     insulin glargine, TOUJEO, (TOUJEO) 300 unit/mL (1.5 mL) InPn pen Inject 46 Units into the skin.    gabapentin (NEURONTIN) 300 MG capsule Take 300 mg by mouth.    PEN NEEDLE 31 gauge x 1/4" Ndle     [DISCONTINUED] glipiZIDE (GLUCOTROL) 10 MG tablet Take 10 mg by mouth 2 (two) times daily.    [DISCONTINUED] isoniazid (NYDRAZID) 300 MG Tab Take 1 tablet (300 mg total) by mouth once daily. One tablet daily for prevention of tuberculosis    [DISCONTINUED] nystatin-triamcinolone (MYCOLOG II) cream Apply to affected area 2 times daily    [DISCONTINUED] olmesartan-amLODIPin-hcthiazid 40-5-25 mg Tab Take by mouth.    [DISCONTINUED] terbinafine HCL (LAMISIL) 250 mg tablet TK 1 T PO ONCE D FOR 28 DAYS    [DISCONTINUED] triamterene-hydrochlorothiazide 37.5-25 mg (MAXZIDE-25) 37.5-25 mg per tablet TAKE 1 TABLET BY MOUTH ONCE DAILY IN THE MORNING FOR 30 DAYS    [DISCONTINUED] urea 20 % Crea SEDRICK EXT AA BID     Family History       Problem Relation (Age of Onset)    Diabetes Paternal " Grandmother, Father, Mother    Hypertension Mother          Tobacco Use    Smoking status: Never Smoker    Smokeless tobacco: Never Used   Substance and Sexual Activity    Alcohol use: Yes    Drug use: No    Sexual activity: Yes     Partners: Male     Birth control/protection: None, See Surgical Hx     Comment: , tubal     Review of Systems   Constitutional:  Positive for activity change and appetite change. Negative for chills, diaphoresis and fever.   HENT:  Negative for congestion, nosebleeds and tinnitus.    Eyes:  Negative for photophobia and visual disturbance.   Respiratory:  Negative for cough, chest tightness, shortness of breath and wheezing.    Cardiovascular:  Negative for chest pain, palpitations and leg swelling.   Gastrointestinal:  Positive for abdominal pain, constipation and nausea. Negative for abdominal distention and vomiting.   Endocrine: Negative for cold intolerance and heat intolerance.   Genitourinary:  Negative for difficulty urinating, dysuria, frequency, hematuria and urgency.   Musculoskeletal:  Negative for arthralgias, back pain and myalgias.   Skin:  Negative for pallor, rash and wound.   Allergic/Immunologic: Negative for immunocompromised state.   Neurological:  Negative for dizziness, tremors, facial asymmetry, speech difficulty and weakness.   Hematological:  Negative for adenopathy. Does not bruise/bleed easily.   Psychiatric/Behavioral:  Negative for confusion and sleep disturbance. The patient is not nervous/anxious.    Objective:     Vital Signs (Most Recent):  Temp: 97.2 °F (36.2 °C) (08/07/22 0346)  Pulse: 92 (08/07/22 0346)  Resp: 17 (08/07/22 0346)  BP: 128/84 (08/07/22 0346)  SpO2: 99 % (08/07/22 0346) Vital Signs (24h Range):  Temp:  [97.2 °F (36.2 °C)-98.6 °F (37 °C)] 97.2 °F (36.2 °C)  Pulse:  [88-92] 92  Resp:  [17-18] 17  SpO2:  [99 %-100 %] 99 %  BP: (128-135)/(79-84) 128/84     Weight: 118.6 kg (261 lb 7.5 oz)  Body mass index is 40.95 kg/m².    Physical  Exam  Vitals and nursing note reviewed.   Constitutional:       General: She is not in acute distress.     Appearance: She is well-developed. She is ill-appearing. She is not diaphoretic.   HENT:      Head: Normocephalic.      Mouth/Throat:      Mouth: Mucous membranes are dry.   Eyes:      General: No scleral icterus.     Conjunctiva/sclera: Conjunctivae normal.      Pupils: Pupils are equal, round, and reactive to light.   Neck:      Vascular: No JVD.   Cardiovascular:      Rate and Rhythm: Normal rate and regular rhythm.      Heart sounds: Normal heart sounds. No murmur heard.    No friction rub. No gallop.   Pulmonary:      Effort: Pulmonary effort is normal. No respiratory distress.      Breath sounds: Normal breath sounds. No wheezing or rales.   Abdominal:      General: Bowel sounds are normal. There is no distension.      Palpations: Abdomen is soft.      Tenderness: There is no abdominal tenderness. There is no guarding or rebound.   Musculoskeletal:         General: No tenderness. Normal range of motion.      Cervical back: Normal range of motion and neck supple.   Lymphadenopathy:      Cervical: No cervical adenopathy.   Skin:     General: Skin is warm and dry.      Capillary Refill: Capillary refill takes less than 2 seconds.      Coloration: Skin is not pale.      Findings: No erythema or rash.   Neurological:      Mental Status: She is alert and oriented to person, place, and time.      Cranial Nerves: No cranial nerve deficit.      Sensory: No sensory deficit.      Coordination: Coordination normal.      Deep Tendon Reflexes: Reflexes normal.   Psychiatric:         Behavior: Behavior normal.         Thought Content: Thought content normal.         Judgment: Judgment normal.         CRANIAL NERVES     CN III, IV, VI   Pupils are equal, round, and reactive to light.     Significant Labs: All pertinent labs within the past 24 hours have been reviewed.  CBC: No results for input(s): WBC, HGB, HCT, PLT in  the last 48 hours.  CMP: No results for input(s): NA, K, CL, CO2, GLU, BUN, CREATININE, CALCIUM, PROT, ALBUMIN, BILITOT, ALKPHOS, AST, ALT, ANIONGAP, EGFRNONAA in the last 48 hours.    Invalid input(s): ESTGFAFRICA  Urine Culture: No results for input(s): LABURIN in the last 48 hours.  Urine Studies: No results for input(s): COLORU, APPEARANCEUA, PHUR, SPECGRAV, PROTEINUA, GLUCUA, KETONESU, BILIRUBINUA, OCCULTUA, NITRITE, UROBILINOGEN, LEUKOCYTESUR, RBCUA, WBCUA, BACTERIA, SQUAMEPITHEL, HYALINECASTS in the last 48 hours.    Invalid input(s): WRIGHTSUR    Significant Imaging: I have reviewed all pertinent imaging results/findings within the past 24 hours.  CT abdomen pelvis:  FINDINGS:   Limited evaluation of the lower chest reveals clear lung bases.     Liver, spleen, pancreas, and both adrenal glands appear normal. Gallbladder shows no suspicious finding. No significant intrahepatic or common bile duct dilatation.     Kidneys are symmetric in their noncontrast configuration. There is no hydronephrosis or hydroureter. Urinary bladder is collapsed given the presence of a Boateng catheter.     A large stool ball is noted within the rectum. There is concomitant mild rectal wall thickening with adjacent minimal inflammation. No evidence of bowel obstruction. Appendix is normal.     Calcifications are evident about the abdominal aorta. No suspicious lymphadenopathy or drainable fluid collections. Vascular collaterals are noted about the anterior/inferior pelvic wall.     Bone windows show no destructive osseous lesions.     IMPRESSION:   Large stool ball located within the rectum with mild rectal wall thickening and adjacent inflammation, concerning for early stercoral colitis. Recommend disimpaction/catharsis.

## 2022-08-07 NOTE — ASSESSMENT & PLAN NOTE
Chronic  Chronic, controlled.  Latest blood pressure and vitals reviewed-   Temp:  [97.2 °F (36.2 °C)-98.6 °F (37 °C)]   Pulse:  [88-92]   Resp:  [17-18]   BP: (128-135)/(79-84)   SpO2:  [99 %-100 %] .   Home meds for hypertension were reviewed and noted below.   Hypertension Medications             amlodipine-benazepril 10-20mg (LOTREL) 10-20 mg per capsule     hydrochlorothiazide (HYDRODIURIL) 25 MG tablet           While in the hospital, will manage blood pressure as follows; Continue home antihypertensive regimen    Will utilize p.r.n. blood pressure medication only if patient's blood pressure greater than  180/110 and she develops symptoms such as worsening chest pain or shortness of breath.

## 2022-08-07 NOTE — ASSESSMENT & PLAN NOTE
Patient's FSGs are controlled on current medication regimen.  Last A1c reviewed-   Lab Results   Component Value Date    HGBA1C 11.5 (H) 06/21/2017     Most recent fingerstick glucose reviewed- No results for input(s): POCTGLUCOSE in the last 24 hours.  Current correctional scale  Medium  Maintain anti-hyperglycemic dose as follows-   Antihyperglycemics (From admission, onward)            Start     Stop Route Frequency Ordered    08/07/22 0619  insulin aspart U-100 pen 1-10 Units         -- SubQ Before meals & nightly PRN 08/07/22 0522        Hold Oral hypoglycemics while patient is in the hospital.

## 2022-08-07 NOTE — PLAN OF CARE
POC reviewed with Patient verbalizes understanding IVF infusing as ordered VSS Afebrile Fleets enemas given No results, noted at this time Remains NPO

## 2022-08-07 NOTE — ASSESSMENT & PLAN NOTE
Acute problem  NPO  IV fluids  Appreciate recommendations from GI     Call bell, personal items and telephone within reach/Stretcher in lowest position, wheels locked, appropriate side rails in place

## 2022-08-07 NOTE — PROVIDER TRANSFER
Outside Transfer Acceptance Note / Regional Referral Center    Referring facility: [facility]   Referring provider: RUBEN BRISCOE  Accepting facility: [facility]  Accepting provider: MEGAN LEO  Admitting provider: JESSENIA LUCERO  Reason for transfer: Higher Level of Care   Transfer diagnosis: abdominal pain  Transfer specialty requested: Hospital Medicine  Transfer specialty notified: yes  Transfer level: NUMBER 1-5: 2  Bed type requested: MED SURG  Isolation status: No active isolations   Admission class or status: IP- Inpatient      Narrative     Ms. Felton is a 40yo lady with a past medical history of H.pylori, DM2, Fe deficiency anemia, fatty liver, HTN, gallstones s/p cori, dysfunctional uterine bleeding s/p JEZ, SS trait, and chronic constipation.    She now presents to the ED with one day of constipation and difficulty urinating.  In the ED, bladder scan showed her to have urinary retention.  A rasmussen was placed.    VS showed T 98.6F, /79, 88, 18, 100% RA.  Labs were normal per sending MD.  CT abdomen without contrast showed a large stool ball located within the rectum with mild rectal wall thickening and adjacent inflammation, concerning for early stercoral colitis. Recommend disimpaction/catharsis.    She was manually disimpacted in the ED with relief.  She was given Cipro/Flagyl (antibiotics not indicated for stercoralcolitis from impaction, especially if self limited).   Dr Neves with GI at [facility] agrees to consult.    Objective     Vitals:   Temp:  [98.6 °F (37 °C)] 98.6 °F (37 °C)  Pulse:  [88] 88  Resp:  [18] 18  SpO2:  [100 %] 100 %  BP: (135)/(79) 135/79     Recent Labs: All pertinent labs within the past 24 hours have been reviewed.     Allergies:   Review of patient's allergies indicates:   Allergen Reactions    Pcn [penicillins] Hives      NPO: No      Anticoagulation:   Anticoagulants     None           Instructions      [facility]  Admit to Hospital Medicine  Upon patient arrival to floor, please contact Hospital Medicine on call.

## 2022-08-07 NOTE — PLAN OF CARE
[facility]  Initial Discharge Assessment       Primary Care Provider: [facility]    Admission Diagnosis: Abdominal pain [R10.9]    Admission Date: 8/7/2022  Expected Discharge Date:  to be determined    SW met with pt at bedside to complete discharge assessment, verified PCP, pharmacy and information on facesheet.  No HH, DME, dialysis and doesn't take Coumadin.  Spouse will drive pt home.  No needs identified at this time.  Pt reported that she has [employer], effective 8/1/2022 and gave SW a [employer] paper the group/subgroup # [id number].    Discharge Barriers Identified: None    Payor: /     Extended Emergency Contact Information  Primary Emergency Contact: YanelyelbaRick  Address: [address] [place] [address] [place]  Mobile Phone: [phone]  Relation: Spouse    Discharge Plan A: Home  Discharge Plan B: Home      [id number] [address] AT [address] [address]  Phone: [phone] Fax: [phone]      Initial Assessment (most recent)     Adult Discharge Assessment - 08/07/22 1100        Discharge Assessment    Assessment Type Discharge Planning Assessment     Confirmed/corrected address, phone number and insurance Yes     Confirmed Demographics Correct on Facesheet     Source of Information patient     Communicated JOSI with patient/caregiver No     Lives With spouse;grandchild(gabriel);child(gabriel), adult     Do you expect to return to your current living situation? Yes     Prior to hospitilization cognitive status: Alert/Oriented     Current cognitive status: Alert/Oriented     Walking or Climbing Stairs Difficulty none     Dressing/Bathing Difficulty none     Equipment Currently Used at Home none     Readmission within 30 days? No     Patient currently being followed by outpatient case management? No     Do you currently have  service(s) that help you manage your care at home? No     Do you take prescription medications? Yes     Do you have prescription coverage? Yes     Coverage [employer]     Do you have any problems affording any of your prescribed medications? No     Is the patient taking medications as prescribed? yes     Who is going to help you get home at discharge? spouse     How do you get to doctors appointments? car, drives self     Are you on dialysis? No     Do you take coumadin? No     Discharge Plan A Home     Discharge Plan B Home     DME Needed Upon Discharge  none     Discharge Plan discussed with: Patient     Discharge Barriers Identified None

## 2022-08-07 NOTE — H&P
[facility] Medicine  History & Physical    Patient Name: Dee Felton  MRN: [id number]  Patient Class: IP- Inpatient  Admission Date: 8/7/2022  Attending Physician: Joselyn Miller MD   Primary Care Provider: Samantha Sands MD         Patient information was obtained from patient, spouse/SO, past medical records and ER records.     Subjective:     Principal Problem:Stercoral colitis    Chief Complaint: No chief complaint on file.       HPI: Dee Felton is a 41-year-old female who presents in transfer from outside facility for evaluation of abdominal pain, and stercoral colitis.  Patient reports that approximately 1 month ago she changed jobs which required her to change her insurance and she ended up with no coverage for approximately 1 month.  She was taking Linzess prior to this insurance change.  She was unable to afford the medication and did not take it for approximately 1 month.  She reports intermittent constipation during that time.  She presented to outside facility for evaluation of lower abdominal pain and constipation.  She reports her last bowel movement which was small was on Friday.  She underwent some disimpaction outside facility.  CT scan showed findings consistent with stercoral colitis.  Previous medical history includes diabetes, H pylori, fatty liver, sickle cell trait, chronic constipation, hypertension.  Patient admitted to Hospital Medicine for treatment management.  Will start patient on IV fluids.  Maintain NPO.  GI consult in a.m..      Past Medical History:   Diagnosis Date    Anemia     Diabetes mellitus     Diabetes mellitus, type 2     Hypertension     Sickle cell trait        Past Surgical History:   Procedure Laterality Date    CHOLECYSTECTOMY      HYSTERECTOMY      TUBAL LIGATION         Review of patient's allergies indicates:   Allergen Reactions    Pcn [penicillins] Hives    Metformin Nausea And Vomiting       Current  "Facility-Administered Medications on File Prior to Encounter   Medication    [DISCONTINUED] ciprofloxacin (CIPRO)400mg/200ml D5W IVPB    [DISCONTINUED] metronidazole IVPB     Current Outpatient Medications on File Prior to Encounter   Medication Sig    amlodipine-benazepril 10-20mg (LOTREL) 10-20 mg per capsule     atorvastatin (LIPITOR) 20 MG tablet Take 20 mg by mouth.    cetirizine (ZYRTEC) 10 MG tablet Take 10 mg by mouth.    gabapentin (NEURONTIN) 300 MG capsule Take 800 mg by mouth 2 (two) times daily.    hydrochlorothiazide (HYDRODIURIL) 25 MG tablet     insulin glargine, TOUJEO, (TOUJEO) 300 unit/mL (1.5 mL) InPn pen Inject 46 Units into the skin.    gabapentin (NEURONTIN) 300 MG capsule Take 300 mg by mouth.    PEN NEEDLE 31 gauge x 1/4" Ndle     [DISCONTINUED] glipiZIDE (GLUCOTROL) 10 MG tablet Take 10 mg by mouth 2 (two) times daily.    [DISCONTINUED] isoniazid (NYDRAZID) 300 MG Tab Take 1 tablet (300 mg total) by mouth once daily. One tablet daily for prevention of tuberculosis    [DISCONTINUED] nystatin-triamcinolone (MYCOLOG II) cream Apply to affected area 2 times daily    [DISCONTINUED] olmesartan-amLODIPin-hcthiazid 40-5-25 mg Tab Take by mouth.    [DISCONTINUED] terbinafine HCL (LAMISIL) 250 mg tablet TK 1 T PO ONCE D FOR 28 DAYS    [DISCONTINUED] triamterene-hydrochlorothiazide 37.5-25 mg (MAXZIDE-25) 37.5-25 mg per tablet TAKE 1 TABLET BY MOUTH ONCE DAILY IN THE MORNING FOR 30 DAYS    [DISCONTINUED] urea 20 % Crea SEDRICK EXT AA BID     Family History       Problem Relation (Age of Onset)    Diabetes Paternal Grandmother, Father, Mother    Hypertension Mother          Tobacco Use    Smoking status: Never Smoker    Smokeless tobacco: Never Used   Substance and Sexual Activity    Alcohol use: Yes    Drug use: No    Sexual activity: Yes     Partners: Male     Birth control/protection: None, See Surgical Hx     Comment: , tubal     Review of Systems   Constitutional:  " Positive for activity change and appetite change. Negative for chills, diaphoresis and fever.   HENT:  Negative for congestion, nosebleeds and tinnitus.    Eyes:  Negative for photophobia and visual disturbance.   Respiratory:  Negative for cough, chest tightness, shortness of breath and wheezing.    Cardiovascular:  Negative for chest pain, palpitations and leg swelling.   Gastrointestinal:  Positive for abdominal pain, constipation and nausea. Negative for abdominal distention and vomiting.   Endocrine: Negative for cold intolerance and heat intolerance.   Genitourinary:  Negative for difficulty urinating, dysuria, frequency, hematuria and urgency.   Musculoskeletal:  Negative for arthralgias, back pain and myalgias.   Skin:  Negative for pallor, rash and wound.   Allergic/Immunologic: Negative for immunocompromised state.   Neurological:  Negative for dizziness, tremors, facial asymmetry, speech difficulty and weakness.   Hematological:  Negative for adenopathy. Does not bruise/bleed easily.   Psychiatric/Behavioral:  Negative for confusion and sleep disturbance. The patient is not nervous/anxious.    Objective:     Vital Signs (Most Recent):  Temp: 97.2 °F (36.2 °C) (08/07/22 0346)  Pulse: 92 (08/07/22 0346)  Resp: 17 (08/07/22 0346)  BP: 128/84 (08/07/22 0346)  SpO2: 99 % (08/07/22 0346) Vital Signs (24h Range):  Temp:  [97.2 °F (36.2 °C)-98.6 °F (37 °C)] 97.2 °F (36.2 °C)  Pulse:  [88-92] 92  Resp:  [17-18] 17  SpO2:  [99 %-100 %] 99 %  BP: (128-135)/(79-84) 128/84     Weight: 118.6 kg (261 lb 7.5 oz)  Body mass index is 40.95 kg/m².    Physical Exam  Vitals and nursing note reviewed.   Constitutional:       General: She is not in acute distress.     Appearance: She is well-developed. She is ill-appearing. She is not diaphoretic.   HENT:      Head: Normocephalic.      Mouth/Throat:      Mouth: Mucous membranes are dry.   Eyes:      General: No scleral icterus.     Conjunctiva/sclera: Conjunctivae normal.       Pupils: Pupils are equal, round, and reactive to light.   Neck:      Vascular: No JVD.   Cardiovascular:      Rate and Rhythm: Normal rate and regular rhythm.      Heart sounds: Normal heart sounds. No murmur heard.    No friction rub. No gallop.   Pulmonary:      Effort: Pulmonary effort is normal. No respiratory distress.      Breath sounds: Normal breath sounds. No wheezing or rales.   Abdominal:      General: Bowel sounds are normal. There is no distension.      Palpations: Abdomen is soft.      Tenderness: There is no abdominal tenderness. There is no guarding or rebound.   Musculoskeletal:         General: No tenderness. Normal range of motion.      Cervical back: Normal range of motion and neck supple.   Lymphadenopathy:      Cervical: No cervical adenopathy.   Skin:     General: Skin is warm and dry.      Capillary Refill: Capillary refill takes less than 2 seconds.      Coloration: Skin is not pale.      Findings: No erythema or rash.   Neurological:      Mental Status: She is alert and oriented to person, place, and time.      Cranial Nerves: No cranial nerve deficit.      Sensory: No sensory deficit.      Coordination: Coordination normal.      Deep Tendon Reflexes: Reflexes normal.   Psychiatric:         Behavior: Behavior normal.         Thought Content: Thought content normal.         Judgment: Judgment normal.         CRANIAL NERVES     CN III, IV, VI   Pupils are equal, round, and reactive to light.     Significant Labs: All pertinent labs within the past 24 hours have been reviewed.  CBC: No results for input(s): WBC, HGB, HCT, PLT in the last 48 hours.  CMP: No results for input(s): NA, K, CL, CO2, GLU, BUN, CREATININE, CALCIUM, PROT, ALBUMIN, BILITOT, ALKPHOS, AST, ALT, ANIONGAP, EGFRNONAA in the last 48 hours.    Invalid input(s): ESTGFAFRICA  Urine Culture: No results for input(s): LABURIN in the last 48 hours.  Urine Studies: No results for input(s): COLORU, APPEARANCEUA, PHUR, SPECGRAV,  PROTEINUA, GLUCUA, KETONESU, BILIRUBINUA, OCCULTUA, NITRITE, UROBILINOGEN, LEUKOCYTESUR, RBCUA, WBCUA, BACTERIA, SQUAMEPITHEL, HYALINECASTS in the last 48 hours.    Invalid input(s): THI    Significant Imaging: I have reviewed all pertinent imaging results/findings within the past 24 hours.  CT abdomen pelvis:  FINDINGS:   Limited evaluation of the lower chest reveals clear lung bases.     Liver, spleen, pancreas, and both adrenal glands appear normal. Gallbladder shows no suspicious finding. No significant intrahepatic or common bile duct dilatation.     Kidneys are symmetric in their noncontrast configuration. There is no hydronephrosis or hydroureter. Urinary bladder is collapsed given the presence of a Boateng catheter.     A large stool ball is noted within the rectum. There is concomitant mild rectal wall thickening with adjacent minimal inflammation. No evidence of bowel obstruction. Appendix is normal.     Calcifications are evident about the abdominal aorta. No suspicious lymphadenopathy or drainable fluid collections. Vascular collaterals are noted about the anterior/inferior pelvic wall.     Bone windows show no destructive osseous lesions.     IMPRESSION:   Large stool ball located within the rectum with mild rectal wall thickening and adjacent inflammation, concerning for early stercoral colitis. Recommend disimpaction/catharsis.      Assessment/Plan:     * Stercoral colitis  Acute problem  NPO  IV fluids  Appreciate recommendations from GI      Obesity  Body mass index is 40.95 kg/m². Morbid obesity complicates all aspects of disease management from diagnostic modalities to treatment. Weight loss encouraged and health benefits explained to patient.         Essential hypertension  Chronic  Chronic, controlled.  Latest blood pressure and vitals reviewed-   Temp:  [97.2 °F (36.2 °C)-98.6 °F (37 °C)]   Pulse:  [88-92]   Resp:  [17-18]   BP: (128-135)/(79-84)   SpO2:  [99 %-100 %] .   Home meds for  hypertension were reviewed and noted below.   Hypertension Medications             amlodipine-benazepril 10-20mg (LOTREL) 10-20 mg per capsule     hydrochlorothiazide (HYDRODIURIL) 25 MG tablet           While in the hospital, will manage blood pressure as follows; Continue home antihypertensive regimen    Will utilize p.r.n. blood pressure medication only if patient's blood pressure greater than  180/110 and she develops symptoms such as worsening chest pain or shortness of breath.      Type 2 diabetes mellitus without complication, with long-term current use of insulin  Patient's FSGs are controlled on current medication regimen.  Last A1c reviewed-   Lab Results   Component Value Date    HGBA1C 11.5 (H) 06/21/2017     Most recent fingerstick glucose reviewed- No results for input(s): POCTGLUCOSE in the last 24 hours.  Current correctional scale  Medium  Maintain anti-hyperglycemic dose as follows-   Antihyperglycemics (From admission, onward)            Start     Stop Route Frequency Ordered    08/07/22 0619  insulin aspart U-100 pen 1-10 Units         -- SubQ Before meals & nightly PRN 08/07/22 0522        Hold Oral hypoglycemics while patient is in the hospital.      VTE Risk Mitigation (From admission, onward)         Ordered     Place KRISTINE hose  Until discontinued         08/07/22 0522     IP VTE HIGH RISK PATIENT  Once         08/07/22 0522     Place sequential compression device  Until discontinued         08/07/22 0522                   Mick Bonilla NP  Department of Hospital Medicine   [facility]    Addendum:  Patient seen and examined. I agree with the findings, assessment and plan as outlined in the note by the nursing practitioner. A substantive portion of history and physical obtained by me.    Patient is a 41-year-old  female with past medical history significant for diabetes mellitus type 2, hypertension, sickle cell trait and chronic anemia who has been  admitted to Hospital Medicine after getting transferred from [facility] Emergency Room where patient presented with complaints of worsening constipation and abdominal discomfort.  Reportedly a CT scan of the abdomen showed stercoral colitis.  Patient reports for the past 6 or 7 days she has not had any decent bowel movement.  Patient changed her job during the past month and during this time she had lapse of medical insurance during which she was unable to afford Linzess.  Patient had colonoscopy done last year during which polyp was removed which was negative for malignancy.  Patient was told to have surveillance colonoscopy in 5 years.  At present patient denies any abdominal pain.  In the emergency room stool disimpaction was performed.  Patient denies any nausea, vomiting, hematemesis or any melena.  Currently afebrile.    On exam, vital signs stable, patient is alert oriented in times face and person.  First and second heart sounds without added tones murmurs or gallops.  Lungs clear to auscultate bilaterally.  Abdomen is soft nontender no hepatosplenomegaly.  Bowel sounds audible.  No peripheral edema or cyanosis.  Neurological examination grossly unremarkable.    Laboratory results reviewed.    Awaiting GI specialist evaluation.  Will go ahead and start patient on GoLYTELY.  Anti constipation measures discussed with the patient.  Patient will need resumption of Linzess upon discharge.  Maintain patient on gastric ulcer prophylaxis and deep venous thrombosis prophylaxis.

## 2022-08-07 NOTE — CARE UPDATE
08/07/22 0641   Patient Assessment/Suction   Level of Consciousness (AVPU) alert   PRE-TX-O2   O2 Device (Oxygen Therapy) room air   SpO2 100 %   Pulse Oximetry Type Intermittent   $ Pulse Oximetry - Multiple Charge Pulse Oximetry - Multiple   Aerosol Therapy   $ Aerosol Therapy Charges PRN treatment not required

## 2022-08-07 NOTE — HPI
Dee Felton is a 41-year-old female who presents in transfer from outside facility for evaluation of abdominal pain, and stercoral colitis.  Patient reports that approximately 1 month ago she changed jobs which required her to change her insurance and she ended up with no coverage for approximately 1 month.  She was taking Linzess prior to this insurance change.  She was unable to afford the medication and did not take it for approximately 1 month.  She reports intermittent constipation during that time.  She presented to outside facility for evaluation of lower abdominal pain and constipation.  She reports her last bowel movement which was small was on Friday.  She underwent some disimpaction outside facility.  CT scan showed findings consistent with stercoral colitis.  Previous medical history includes diabetes, H pylori, fatty liver, sickle cell trait, chronic constipation, hypertension.  Patient admitted to Hospital Medicine for treatment management.  Will start patient on IV fluids.  Maintain NPO.  GI consult in a.m..

## 2022-08-07 NOTE — NURSING
Ambulated to toilet Fleets enema given Small amount of stool passed satyed positioned on toilet for possible Bowel movement

## 2022-08-08 VITALS
SYSTOLIC BLOOD PRESSURE: 141 MMHG | OXYGEN SATURATION: 98 % | HEIGHT: 67 IN | BODY MASS INDEX: 41.03 KG/M2 | HEART RATE: 86 BPM | RESPIRATION RATE: 16 BRPM | TEMPERATURE: 98 F | WEIGHT: 261.44 LBS | DIASTOLIC BLOOD PRESSURE: 77 MMHG

## 2022-08-08 LAB
ALBUMIN SERPL BCP-MCNC: 3.3 G/DL (ref 3.5–5.2)
ALP SERPL-CCNC: 90 U/L (ref 55–135)
ALT SERPL W/O P-5'-P-CCNC: 29 U/L (ref 10–44)
ANION GAP SERPL CALC-SCNC: 11 MMOL/L (ref 8–16)
AST SERPL-CCNC: 25 U/L (ref 10–40)
BASOPHILS # BLD AUTO: 0.03 K/UL (ref 0–0.2)
BASOPHILS NFR BLD: 0.3 % (ref 0–1.9)
BILIRUB SERPL-MCNC: 0.5 MG/DL (ref 0.1–1)
BUN SERPL-MCNC: 13 MG/DL (ref 6–20)
CALCIUM SERPL-MCNC: 8.4 MG/DL (ref 8.7–10.5)
CHLORIDE SERPL-SCNC: 106 MMOL/L (ref 95–110)
CO2 SERPL-SCNC: 25 MMOL/L (ref 23–29)
CREAT SERPL-MCNC: 0.7 MG/DL (ref 0.5–1.4)
DIFFERENTIAL METHOD: ABNORMAL
EOSINOPHIL # BLD AUTO: 0 K/UL (ref 0–0.5)
EOSINOPHIL NFR BLD: 0 % (ref 0–8)
ERYTHROCYTE [DISTWIDTH] IN BLOOD BY AUTOMATED COUNT: 14 % (ref 11.5–14.5)
EST. GFR  (NO RACE VARIABLE): >60 ML/MIN/1.73 M^2
GLUCOSE SERPL-MCNC: 133 MG/DL (ref 70–110)
HCT VFR BLD AUTO: 33.8 % (ref 37–48.5)
HGB BLD-MCNC: 11.6 G/DL (ref 12–16)
IMM GRANULOCYTES # BLD AUTO: 0.04 K/UL (ref 0–0.04)
IMM GRANULOCYTES NFR BLD AUTO: 0.4 % (ref 0–0.5)
LYMPHOCYTES # BLD AUTO: 2.2 K/UL (ref 1–4.8)
LYMPHOCYTES NFR BLD: 20.2 % (ref 18–48)
MAGNESIUM SERPL-MCNC: 2 MG/DL (ref 1.6–2.6)
MCH RBC QN AUTO: 29 PG (ref 27–31)
MCHC RBC AUTO-ENTMCNC: 34.3 G/DL (ref 32–36)
MCV RBC AUTO: 85 FL (ref 82–98)
MONOCYTES # BLD AUTO: 0.9 K/UL (ref 0.3–1)
MONOCYTES NFR BLD: 8 % (ref 4–15)
NEUTROPHILS # BLD AUTO: 7.7 K/UL (ref 1.8–7.7)
NEUTROPHILS NFR BLD: 71.1 % (ref 38–73)
NRBC BLD-RTO: 0 /100 WBC
PLATELET # BLD AUTO: 309 K/UL (ref 150–450)
PMV BLD AUTO: 11 FL (ref 9.2–12.9)
POCT GLUCOSE: 106 MG/DL (ref 70–110)
POCT GLUCOSE: 130 MG/DL (ref 70–110)
POCT GLUCOSE: 93 MG/DL (ref 70–110)
POTASSIUM SERPL-SCNC: 3.7 MMOL/L (ref 3.5–5.1)
PROT SERPL-MCNC: 6.9 G/DL (ref 6–8.4)
RBC # BLD AUTO: 4 M/UL (ref 4–5.4)
SODIUM SERPL-SCNC: 142 MMOL/L (ref 136–145)
WBC # BLD AUTO: 10.81 K/UL (ref 3.9–12.7)

## 2022-08-08 PROCEDURE — 25000003 PHARM REV CODE 250: Performed by: NURSE PRACTITIONER

## 2022-08-08 PROCEDURE — 99232 SBSQ HOSP IP/OBS MODERATE 35: CPT | Mod: ,,, | Performed by: INTERNAL MEDICINE

## 2022-08-08 PROCEDURE — 99900035 HC TECH TIME PER 15 MIN (STAT)

## 2022-08-08 PROCEDURE — 94761 N-INVAS EAR/PLS OXIMETRY MLT: CPT

## 2022-08-08 PROCEDURE — 80053 COMPREHEN METABOLIC PANEL: CPT | Performed by: NURSE PRACTITIONER

## 2022-08-08 PROCEDURE — 83735 ASSAY OF MAGNESIUM: CPT | Performed by: NURSE PRACTITIONER

## 2022-08-08 PROCEDURE — 85025 COMPLETE CBC W/AUTO DIFF WBC: CPT | Performed by: NURSE PRACTITIONER

## 2022-08-08 PROCEDURE — G0378 HOSPITAL OBSERVATION PER HR: HCPCS

## 2022-08-08 PROCEDURE — 99232 PR SUBSEQUENT HOSPITAL CARE,LEVL II: ICD-10-PCS | Mod: ,,, | Performed by: INTERNAL MEDICINE

## 2022-08-08 PROCEDURE — 25000003 PHARM REV CODE 250: Performed by: INTERNAL MEDICINE

## 2022-08-08 PROCEDURE — 36415 COLL VENOUS BLD VENIPUNCTURE: CPT | Performed by: NURSE PRACTITIONER

## 2022-08-08 RX ORDER — POLYETHYLENE GLYCOL 3350 17 G/17G
17 POWDER, FOR SOLUTION ORAL 2 TIMES DAILY
Qty: 60 EACH | Refills: 1 | Status: SHIPPED | OUTPATIENT
Start: 2022-08-08 | End: 2023-08-08

## 2022-08-08 RX ORDER — BISACODYL 10 MG
10 SUPPOSITORY, RECTAL RECTAL DAILY PRN
Qty: 12 SUPPOSITORY | Refills: 3 | Status: SHIPPED | OUTPATIENT
Start: 2022-08-08

## 2022-08-08 RX ADMIN — ACETAMINOPHEN 650 MG: 325 TABLET ORAL at 12:08

## 2022-08-08 RX ADMIN — BENAZEPRIL HYDROCHLORIDE 20 MG: 10 TABLET ORAL at 12:08

## 2022-08-08 RX ADMIN — BISACODYL 10 MG: 10 SUPPOSITORY RECTAL at 12:08

## 2022-08-08 RX ADMIN — MUPIROCIN: 20 OINTMENT TOPICAL at 12:08

## 2022-08-08 RX ADMIN — AMLODIPINE BESYLATE 10 MG: 5 TABLET ORAL at 12:08

## 2022-08-08 RX ADMIN — PANTOPRAZOLE SODIUM 40 MG: 40 TABLET, DELAYED RELEASE ORAL at 12:08

## 2022-08-08 NOTE — PROGRESS NOTES
"  CC: abdominal pain    HPI 41 y.o. female with history of DMII, diabetes, H pylori, fatty liver, sickle cell trait, chronic constipation, hypertension. who presents for evaluation of diffuse, crampy abdominal pain associated with constipation and imaging findings of stercoral colitis.    She is a transfer from outside facility for evaluation of stercoral colitis. One month ago she changed jobs which required her to change her insurance and she ended up with no coverage for one month. She was taking Linzess but due to cost could not afford medication without insurance.    She has been severely constipated since. She has tried multiple OTC regimens. Eventually went to ED yesterday for abdominal pain and constipation. CT showed stercoral colitis.     She had disimpaction while in ED. CT scan showed findings consistent with stercoral colitis.      Interval hx:  Had multiple bowel movements after enema and suppository last night. Tolerated golytely without any issues. No abdominal pain. Tolerating diet.    Past Medical History:   Diagnosis Date    Anemia     Diabetes mellitus     Diabetes mellitus, type 2     Hypertension     Sickle cell trait      Review of Systems  General ROS: negative for chills, fever or weight loss  Respiratory ROS: no cough, shortness of breath, or wheezing  Cardiovascular ROS: no chest pain or dyspnea on exertion  Gastrointestinal ROS: +improved constipation,+ improved abdominal pain, change in bowel habits, or black/ bloody stools    Physical Examination  BP (!) 141/77 (BP Location: Left arm, Patient Position: Lying)   Pulse 86   Temp 97.8 °F (36.6 °C) (Oral)   Resp 16   Ht 5' 7" (1.702 m)   Wt 118.6 kg (261 lb 7.5 oz)   LMP 04/21/2020 (Approximate)   SpO2 98%   Breastfeeding No   BMI 40.95 kg/m²   General appearance: alert, cooperative, no distress  HENT: Normocephalic, atraumatic, neck symmetrical, no nasal discharge   Eyes: conjunctivae/corneas clear, PERRL, EOM's " intact  Lungs: no labored breathing, symmetric chest wall expansion bilaterally  Heart: regular rate and rhythm without rub; no displacement of the PMI   Abdomen: soft, mild tenderness; bowel sounds normoactive; no organomegaly  Extremities: extremities symmetric; no clubbing, cyanosis, or edema  Integument: Skin color, texture, turgor normal; no rashes; hair distrubution normal  Neurologic: Alert and oriented X 3, normal strength, normal coordination and gait  Psychiatric: no pressured speech; normal affect; no evidence of impaired cognition     Labs:  Lab Results   Component Value Date    WBC 10.81 08/08/2022    HGB 11.6 (L) 08/08/2022    HCT 33.8 (L) 08/08/2022    MCV 85 08/08/2022     08/08/2022     CMP  Sodium   Date Value Ref Range Status   08/08/2022 142 136 - 145 mmol/L Final     Potassium   Date Value Ref Range Status   08/08/2022 3.7 3.5 - 5.1 mmol/L Final     Chloride   Date Value Ref Range Status   08/08/2022 106 95 - 110 mmol/L Final     CO2   Date Value Ref Range Status   08/08/2022 25 23 - 29 mmol/L Final     Glucose   Date Value Ref Range Status   08/08/2022 133 (H) 70 - 110 mg/dL Final     BUN   Date Value Ref Range Status   08/08/2022 13 6 - 20 mg/dL Final     Creatinine   Date Value Ref Range Status   08/08/2022 0.7 0.5 - 1.4 mg/dL Final     Calcium   Date Value Ref Range Status   08/08/2022 8.4 (L) 8.7 - 10.5 mg/dL Final     Total Protein   Date Value Ref Range Status   08/08/2022 6.9 6.0 - 8.4 g/dL Final     Albumin   Date Value Ref Range Status   08/08/2022 3.3 (L) 3.5 - 5.2 g/dL Final     Total Bilirubin   Date Value Ref Range Status   08/08/2022 0.5 0.1 - 1.0 mg/dL Final     Comment:     For infants and newborns, interpretation of results should be based  on gestational age, weight and in agreement with clinical  observations.    Premature Infant recommended reference ranges:  Up to 24 hours.............<8.0 mg/dL  Up to 48 hours............<12.0 mg/dL  3-5 days..................<15.0  mg/dL  6-29 days.................<15.0 mg/dL       Alkaline Phosphatase   Date Value Ref Range Status   08/08/2022 90 55 - 135 U/L Final     AST   Date Value Ref Range Status   08/08/2022 25 10 - 40 U/L Final     ALT   Date Value Ref Range Status   08/08/2022 29 10 - 44 U/L Final     Anion Gap   Date Value Ref Range Status   08/08/2022 11 8 - 16 mmol/L Final     eGFR if    Date Value Ref Range Status   01/28/2021 >60 >60 mL/min/1.73 m^2 Final     eGFR if non    Date Value Ref Range Status   01/28/2021 >60 >60 mL/min/1.73 m^2 Final     Comment:     Calculation used to obtain the estimated glomerular filtration  rate (eGFR) is the CKD-EPI equation.        Imaging:  CT A/P:    FINDINGS:   Limited evaluation of the lower chest reveals clear lung bases.     Liver, spleen, pancreas, and both adrenal glands appear normal. Gallbladder shows no suspicious finding. No significant intrahepatic or common bile duct dilatation.     Kidneys are symmetric in their noncontrast configuration. There is no hydronephrosis or hydroureter. Urinary bladder is collapsed given the presence of a Boateng catheter.     A large stool ball is noted within the rectum. There is concomitant mild rectal wall thickening with adjacent minimal inflammation. No evidence of bowel obstruction. Appendix is normal.     Calcifications are evident about the abdominal aorta. No suspicious lymphadenopathy or drainable fluid collections. Vascular collaterals are noted about the anterior/inferior pelvic wall.     Bone windows show no destructive osseous lesions.     IMPRESSION:   Large stool ball located within the rectum with mild rectal wall thickening and adjacent inflammation, concerning for early stercoral colitis. Recommend disimpaction/catharsis.     Assessment:   1. Stercoral colitis  2. Constipation    Plan:   -Patient to start Linzess 290 mcg PO daily on discharge   -Dulcolax 10 mg suppositories as needed  -Miralax as  needed  -UTD with colonoscopy last year without significant findings per patient  -OK for d/c from GI standpoint    Nikolai Neves MD  [facility]  [address]  [place] [address]  Office: [phone]  Fax: [phone]

## 2022-08-08 NOTE — CARE UPDATE
08/08/22 0649   Patient Assessment/Suction   Level of Consciousness (AVPU) alert   PRE-TX-O2   O2 Device (Oxygen Therapy) room air   SpO2 100 %   Pulse Oximetry Type Intermittent   $ Pulse Oximetry - Multiple Charge Pulse Oximetry - Multiple   Aerosol Therapy   $ Aerosol Therapy Charges PRN treatment not required

## 2022-08-08 NOTE — CONSULTS
"  [facility]  Adult Nutrition  Consult Note    SUMMARY     Recommendations  Recommendation/Intervention: 1.) Continue with Clear Liquids advancing as tolerated to GI Soft diet  Goals: 1.) diet will advance within 48 hrs  Nutrition Goal Status: new  Communication of RD Recs: other (comment) (POC)     1. Abdominal pain      Past Medical History:   Diagnosis Date    Anemia     Diabetes mellitus     Diabetes mellitus, type 2     Hypertension     Sickle cell trait          Assessment and Plan  Nutrition Problem  Inadequate energy intake    Related to (etiology):   Colitis     Signs and Symptoms (as evidenced by):   Abdominal pain  Clear liquid diet     Interventions/Recommendations (treatment strategy):  Advance diet when appropriate     Nutrition Diagnosis Status:   New           Malnutrition Assessment  Subcutaneous Fat Loss (Final Summary): well nourished  Muscle Loss Evaluation (Final Summary): well nourished         Reason for Assessment  Reason For Assessment: consult  General Information Comments: 40 y/o female admits with stercoral colitis, abdominal pain. GI consulted. Pt awake/alert in bed during RD visit. States she normally eats x2 daily at home, but has had decreased PO intake for a few weeks prior to admit due to abdominal pain. Tolerating clear liquids currently. Pt states her weight fluctuates between 245-260 lbs. No weight loss noted. Appears well nourished. Will monitor diet advancement.  Nutrition Discharge Planning: to be determined    Nutrition Risk Screen  Nutrition Risk Screen: no indicators present    Nutrition/Diet History  Food Allergies: NKFA    Anthropometrics  Temp: 97.8 °F (36.6 °C)  Height Method: Stated  Height: 5' 7"  Height (inches): 67 in  Weight Method: Bed Scale  Weight: 118.6 kg (261 lb 7.5 oz)  Weight (lb): 261.47 lb  Ideal Body Weight (IBW), Female: 135 lb  % Ideal Body Weight, Female (lb): 193.68 %  BMI (Calculated): 40.9  BMI Grade: greater than 40 - " morbid obesity  Usual Body Weight (UBW), k.18 kg  % Usual Body Weight: 100.57  % Weight Change From Usual Weight: 0.35 %       Lab/Procedures/Meds  Pertinent Labs Reviewed: reviewed  BMP  Lab Results   Component Value Date     2022    K 3.7 2022     2022    CO2 25 2022    BUN 13 2022    CREATININE 0.7 2022    CALCIUM 8.4 (L) 2022    ANIONGAP 11 2022    ESTGFRAFRICA >60 2021    EGFRNONAA >60 2021     Lab Results   Component Value Date    ALBUMIN 3.3 (L) 2022     Lab Results   Component Value Date    CALCIUM 8.4 (L) 2022     Recent Labs   Lab 22  0755   POCTGLUCOSE 93     Wt Readings from Last 3 Encounters:   22 1046 118.6 kg (261 lb 7.5 oz)   22 0346 118.6 kg (261 lb 7.5 oz)   22 0822 117 kg (258 lb)   21 1654 116.1 kg (256 lb)         Pertinent Medications Reviewed: reviewed  Scheduled Meds:   amLODIPine  10 mg Oral Daily    And    benazepriL  20 mg Oral Daily    bisacodyL  10 mg Rectal Daily    enoxaparin  40 mg Subcutaneous Q24H    mupirocin   Nasal BID    pantoprazole  40 mg Oral Daily         Estimated/Assessed Needs  Weight Used For Calorie Calculations: 118.6 kg (261 lb 7.5 oz)  Energy Calorie Requirements (kcal): 1716-8725  Energy Need Method: [place]- [place]  Protein Requirements: 80-90  Weight Used For Protein Calculations: 118.6 kg (261 lb 7.5 oz)  Estimated Fluid Requirement Method: RDA Method  RDA Method (mL): 1800      Nutrition Prescription Ordered  Current Diet Order: Clear Liquids    Evaluation of Received Nutrient/Fluid Intake    Intake/Output Summary (Last 24 hours) at 2022 1055  Last data filed at 2022 0600  Gross per 24 hour   Intake 2000 ml   Output 1150 ml   Net 850 ml       Tolerance: tolerating  % Intake of Estimated Energy Needs: 0 - 25 %  % Meal Intake: 50 - 75 %    Nutrition Risk  Level of Risk/Frequency of Follow-up:  (x2 weekly)       Monitor and  Evaluation  Food and Nutrient Intake: energy intake, food and beverage intake  Food and Nutrient Adminstration: diet order  Anthropometric Measurements: weight, weight change, body mass index  Biochemical Data, Medical Tests and Procedures: electrolyte and renal panel, glucose/endocrine profile, lipid profile  Nutrition-Focused Physical Findings: overall appearance       Nutrition Follow-Up  RD Follow-up?: Yes

## 2022-08-08 NOTE — DISCHARGE INSTRUCTIONS
Discharge Instructions, [facility]    Thank you for choosing [facility] for your medical care. The primary doctor who is taking care of you at the time of your discharge is Frannie Cedeño MD.     You were admitted to the hospital with Stercoral colitis.     Please note your discharge instructions, including diet/activity restrictions, follow-up appointments, and medication changes.  If you have any questions about your medical issues, prescriptions, or any other questions, please feel free to contact the [facility] Medicine Dept at [phone] and we will help.    If you are previously with Home health, outpatient PT/OT or under a therapy program, you are cleared to return to those programs.    Please direct all long term medication refills and follow up to your primary care provider, [facility]. Thank you again for letting us take care of your health care needs.    Please note the following discharge instructions per your discharging physician-  When able to fill rx, restart [id number] mcg daily. Per GI can do miralax 17 g PO BID until then with dulcolax as needed (sent to pharmacy)

## 2022-08-08 NOTE — HOSPITAL COURSE
41-year-old  female with past medical history significant for diabetes mellitus type 2, hypertension, sickle cell trait and chronic anemia who was admitted to Hospital Medicine after getting transferred from [facility] Emergency Room where patient presented with complaints of worsening constipation and abdominal discomfort.  CT in the emergency room showed stercoral colitis.  Patient changed her job during the past month and during this time she had lapse of medical insurance during which she was unable to afford Linzess. GI followed and recommended.  GoLYTELY, enemas, and clear liquid diet. Patient moved her bowels. She was cleared for discharge by GI who recommended fill Rx for linzess when able, do miralax 17 g PO BID until then with dulcolax as needed. Patient agreeable to discharge plan.

## 2022-08-08 NOTE — NURSING
Patient up in bathroom, passing stool. 3/4 Golytely prep completed. Complaining of headache, Tylenol for headache. Accu check 130. Call light at bedside.

## 2022-08-08 NOTE — DISCHARGE SUMMARY
[facility] Medicine  Discharge Summary      Patient Name: Dee Felton  MRN: [id number]  Patient Class: IP- Inpatient  Admission Date: 8/7/2022  Hospital Length of Stay: 1 days  Discharge Date and Time: 8/8/2022  2:08 PM  Attending Physician: No att. providers found   Discharging Provider: Frannie Cedeño MD  Primary Care Provider: [facility]      HPI:   Dee Felton is a 41-year-old female who presents in transfer from outside facility for evaluation of abdominal pain, and stercoral colitis.  Patient reports that approximately 1 month ago she changed jobs which required her to change her insurance and she ended up with no coverage for approximately 1 month.  She was taking Linzess prior to this insurance change.  She was unable to afford the medication and did not take it for approximately 1 month.  She reports intermittent constipation during that time.  She presented to outside facility for evaluation of lower abdominal pain and constipation.  She reports her last bowel movement which was small was on Friday.  She underwent some disimpaction outside facility.  CT scan showed findings consistent with stercoral colitis.  Previous medical history includes diabetes, H pylori, fatty liver, sickle cell trait, chronic constipation, hypertension.  Patient admitted to Hospital Medicine for treatment management.  Will start patient on IV fluids.  Maintain NPO.  GI consult in a.m..      * No surgery found *      Hospital Course:   41-year-old  female with past medical history significant for diabetes mellitus type 2, hypertension, sickle cell trait and chronic anemia who was admitted to Hospital Medicine after getting transferred from [facility] Emergency Room where patient presented with complaints of worsening constipation and abdominal discomfort.  CT in the emergency room showed stercoral colitis.  Patient changed her job during  the past month and during this time she had lapse of medical insurance during which she was unable to afford Linzess. GI followed and recommended.  GoLYTELY, enemas, and clear liquid diet. Patient moved her bowels. She was cleared for discharge by GI who recommended fill Rx for linzess when able, do miralax 17 g PO BID until then with dulcolax as needed. Patient agreeable to discharge plan.       Goals of Care Treatment Preferences:  Code Status: Full Code      Consults:   Consults (From admission, onward)        Status Ordering Provider     Inpatient consult to Gastroenterology  Once        Provider:  Nikolai Neves MD    Completed PUSHPA CHRISTIANSEN     IP consult to dietary  Once        Provider:  (Not yet assigned)    Completed PUSHPA CHRISTIANSEN          No new Assessment & Plan notes have been filed under this hospital service since the last note was generated.  Service: Hospital Medicine    Final Active Diagnoses:    Diagnosis Date Noted POA    PRINCIPAL PROBLEM:  Stercoral colitis [K52.89] 08/07/2022 Yes    Type 2 diabetes mellitus without complication, with long-term current use of insulin [E11.9, Z79.4] 10/03/2016 Not Applicable    Essential hypertension [I10] 10/03/2016 Yes    Obesity [E66.9] 10/03/2016 Yes      Problems Resolved During this Admission:       Discharged Condition: good    Disposition: Home or Self Care    Follow Up:   Follow-up Information     [facility] Follow up in 3 day(s).    Specialties: Behavioral Health, Psychiatry  Why: with/ NP Tosha Purcell Aug 16, 2022 at 2:45pm  Contact information:  [address] [facility] [address]  [phone]                       Patient Instructions:      Notify your health care provider if you experience any of the following:  temperature >100.4     Notify your health care provider if you experience any of the following:  persistent nausea and vomiting or diarrhea     Notify your health care provider if you experience any  "of the following:  severe uncontrolled pain     Activity as tolerated       Significant Diagnostic Studies: Labs:   BMP:   Recent Labs   Lab 08/07/22  0531 08/08/22  0315   * 133*    142   K 4.1 3.7    106   CO2 23 25   BUN 12 13   CREATININE 0.8 0.7   CALCIUM 9.3 8.4*   MG 1.9 2.0    and CBC   Recent Labs   Lab 08/07/22  0531 08/08/22  0315   WBC 8.58 10.81   HGB 13.1 11.6*   HCT 38.2 33.8*    309       Pending Diagnostic Studies:     None         Medications:  Reconciled Home Medications:      Medication List      START taking these medications    bisacodyL 10 mg Supp  Commonly known as: DULCOLAX  Place 1 suppository (10 mg total) rectally daily as needed (constipation).     linaCLOtide 290 mcg Cap capsule  Commonly known as: LINZESS  Take 1 capsule (290 mcg total) by mouth before breakfast.     polyethylene glycol 17 gram Pwpk  Commonly known as: GLYCOLAX  Take 17 g by mouth 2 (two) times daily.        CONTINUE taking these medications    amlodipine-benazepril 10-20mg 10-20 mg per capsule  Commonly known as: LOTREL     atorvastatin 20 MG tablet  Commonly known as: LIPITOR  Take 20 mg by mouth.     cetirizine 10 MG tablet  Commonly known as: ZYRTEC  Take 10 mg by mouth.     * gabapentin 300 MG capsule  Commonly known as: NEURONTIN  Take 800 mg by mouth 2 (two) times daily.     * gabapentin 300 MG capsule  Commonly known as: NEURONTIN  Take 300 mg by mouth.     hydroCHLOROthiazide 25 MG tablet  Commonly known as: HYDRODIURIL     insulin glargine (TOUJEO) 300 unit/mL (1.5 mL) Inpn pen  Commonly known as: TOUJEO  Inject 46 Units into the skin.     PEN NEEDLE 31 gauge x 1/4" Ndle  Generic drug: pen needle, diabetic         * This list has 2 medication(s) that are the same as other medications prescribed for you. Read the directions carefully, and ask your doctor or other care provider to review them with you.            STOP taking these medications    glipiZIDE 10 MG tablet  Commonly known " as: GLUCOTROL     isoniazid 300 MG Tab  Commonly known as: NYDRAZID     nystatin-triamcinolone cream  Commonly known as: MYCOLOG II     olmesartan-amLODIPin-hcthiazid 40-5-25 mg Tab     terbinafine  mg tablet  Commonly known as: LAMISIL     triamterene-hydrochlorothiazide 37.5-25 mg 37.5-25 mg per tablet  Commonly known as: MAXZIDE-25     urea 20 % Crea            Indwelling Lines/Drains at time of discharge:   Lines/Drains/Airways     None                 Time spent on the discharge of patient: 40 minutes     Discharge plans including specific medication recommendations discussed with GI Dr. Neves on day of discharge    Frannie Cedeño MD  Department of Hospital Medicine  [facility]

## 2022-08-08 NOTE — PLAN OF CARE
Patient cleared for discharge from case management standpoint.       08/08/22 1246   Final Note   Assessment Type Final Discharge Note   Anticipated Discharge Disposition Home   Hospital Resources/Appts/Education Provided Provided patient/caregiver with written discharge plan information;Appointments scheduled and added to AVS;Provided education on problems/symptoms using teachback

## 2022-08-08 NOTE — PLAN OF CARE
POC discussed with patient, verbalized understanding. Patient with uneventful night, slept off and on between care. Tolerated most of prep with good results from suppository. Boateng to be discontinued this am. VS stable.Lovenox. accuchecks.  Tolerating clears. No complaints. Call light at bedside.

## 2022-08-10 ENCOUNTER — TELEPHONE (OUTPATIENT)
Dept: MEDSURG UNIT | Facility: HOSPITAL | Age: 42
End: 2022-08-10
Payer: COMMERCIAL

## 2022-08-18 ENCOUNTER — HOSPITAL ENCOUNTER (EMERGENCY)
Facility: HOSPITAL | Age: 42
Discharge: HOME | End: 2022-08-18
Attending: EMERGENCY MEDICINE
Payer: COMMERCIAL

## 2022-08-18 VITALS
SYSTOLIC BLOOD PRESSURE: 126 MMHG | DIASTOLIC BLOOD PRESSURE: 81 MMHG | BODY MASS INDEX: 40.34 KG/M2 | RESPIRATION RATE: 16 BRPM | WEIGHT: 257 LBS | OXYGEN SATURATION: 96 % | HEART RATE: 82 BPM | TEMPERATURE: 99 F | HEIGHT: 67 IN

## 2022-08-18 DIAGNOSIS — Z20.822 COVID-19 VIRUS RNA NOT DETECTED: ICD-10-CM

## 2022-08-18 DIAGNOSIS — J02.9 SORE THROAT: Primary | ICD-10-CM

## 2022-08-18 LAB
BUN SERPL-MCNC: 8 MG/DL (ref 6–30)
CHLORIDE SERPL-SCNC: 102 MMOL/L (ref 95–110)
CREAT SERPL-MCNC: 0.7 MG/DL (ref 0.5–1.4)
GLUCOSE SERPL-MCNC: 151 MG/DL (ref 70–110)
HCT VFR BLD CALC: 36 %PCV (ref 36–54)
POC IONIZED CALCIUM: 1.17 MMOL/L (ref 1.06–1.42)
POC TCO2 (MEASURED): 26 MMOL/L (ref 23–29)
POTASSIUM BLD-SCNC: 3.8 MMOL/L (ref 3.5–5.1)
SAMPLE: ABNORMAL
SARS-COV-2 RDRP RESP QL NAA+PROBE: NEGATIVE
SODIUM BLD-SCNC: 141 MMOL/L (ref 136–145)

## 2022-08-18 PROCEDURE — 99284 EMERGENCY DEPT VISIT MOD MDM: CPT | Mod: CR,CS,, | Performed by: EMERGENCY MEDICINE

## 2022-08-18 PROCEDURE — 99283 EMERGENCY DEPT VISIT LOW MDM: CPT | Mod: 25

## 2022-08-18 PROCEDURE — U0002 COVID-19 LAB TEST NON-CDC: HCPCS | Performed by: EMERGENCY MEDICINE

## 2022-08-18 PROCEDURE — 99284 PR EMERGENCY DEPT VISIT,LEVEL IV: ICD-10-PCS | Mod: CR,CS,, | Performed by: EMERGENCY MEDICINE

## 2022-08-18 PROCEDURE — 80047 BASIC METABLC PNL IONIZED CA: CPT

## 2022-08-18 NOTE — ED NOTES
Pt is a  and reports that some of her coworkers have tested positive for Covid. Reports symptoms x 3 days. Sore throat, bilateral otalgia, body aches. Denies fever.

## 2022-08-18 NOTE — DISCHARGE INSTRUCTIONS
Covid test was negative  Follow up with your doctor  Return to ED for shortness of breath, chest pain or any other concerns

## 2022-08-18 NOTE — ED PROVIDER NOTES
Encounter Date: 8/18/2022    SCRIBE #1 NOTE: I, ismael yungpoli, am scribing for, and in the presence of,  Sridevi Espinoza MD. I have scribed the entire note.       History     Chief Complaint   Patient presents with    COVID-19 Concerns     Pt c/o sore throat and diarrhea.      Time patient was seen by the provider: 12:19 PM    The patient is a 41 y.o. female with a past medical history of HTN, DM2, sickle cell trait and anemia who presents to the ED with a complaint of COVID-19 concerns onset several days ago. She states she experiences sore throat, diarrhea (2 x today), mild headache, and body aches. 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 She tested positive for COVID-19 one year ago. She denies shortness of breath, nausea, vomiting, difficulty swallowing, chest pain, abdominal pain, or any other associated symptoms at this time.      The history is provided by the patient, medical records and the spouse. No  was used.     Review of patient's allergies indicates:   Allergen Reactions    Pcn [penicillins] Hives    Metformin Nausea And Vomiting     Past Medical History:   Diagnosis Date    Anemia     Diabetes mellitus     Diabetes mellitus, type 2     Hypertension     Sickle cell trait      Past Surgical History:   Procedure Laterality Date    CHOLECYSTECTOMY      HYSTERECTOMY      TUBAL LIGATION       Family History   Problem Relation Age of Onset    Diabetes Paternal Grandmother     Diabetes Father     Diabetes Mother     Hypertension Mother      Social History     Tobacco Use    Smoking status: Never Smoker    Smokeless tobacco: Never Used   Substance Use Topics    Alcohol use: Not Currently    Drug use: No     Review of Systems   Constitutional: Negative for fever.   HENT: Positive for ear pain and sore throat. Negative for trouble swallowing.    Eyes: Negative for visual disturbance.   Respiratory: Negative for  shortness of breath.    Cardiovascular: Negative for chest pain.   Gastrointestinal: Positive for diarrhea. Negative for abdominal pain, nausea and vomiting.   Genitourinary: Negative for dysuria.   Musculoskeletal: Positive for myalgias. Negative for back pain.   Skin: Negative for rash.   Neurological: Positive for headaches (mild). Negative for weakness.   Hematological: Does not bruise/bleed easily.       Physical Exam     Initial Vitals [08/18/22 1054]   BP Pulse Resp Temp SpO2   126/81 82 16 98.8 °F (37.1 °C) 96 %      MAP       --         Physical Exam    Nursing note and vitals reviewed.  Constitutional: She appears well-developed and well-nourished. She is not diaphoretic. No distress.   HENT:   Head: Normocephalic and atraumatic.   Mouth/Throat: Oropharynx is clear and moist. No oropharyngeal exudate.   Eyes: Conjunctivae and EOM are normal. Pupils are equal, round, and reactive to light.   Neck: Neck supple.   Normal range of motion.  Cardiovascular: Normal rate, regular rhythm and intact distal pulses.   Pulmonary/Chest: Breath sounds normal. No respiratory distress.   Abdominal: Abdomen is soft. Bowel sounds are normal. She exhibits no distension. There is no abdominal tenderness. There is no rebound and no guarding.   Musculoskeletal:         General: No tenderness or edema. Normal range of motion.      Cervical back: Normal range of motion and neck supple.     Neurological: She is alert and oriented to person, place, and time. She has normal strength. GCS score is 15. GCS eye subscore is 4. GCS verbal subscore is 5. GCS motor subscore is 6.   Skin: Skin is warm and dry. No rash noted.   Psychiatric: She has a normal mood and affect. Thought content normal.         ED Course   Procedures  Labs Reviewed   ISTAT PROCEDURE - Abnormal; Notable for the following components:       Result Value    POC Glucose 151 (*)     All other components within normal limits   SARS-COV-2 RNA AMPLIFICATION, QUAL     Narrative:     Is the patient symptomatic?->Yes  Is this needed for pre-procedure or pre-op testing?->No          Imaging Results    None          Medications - No data to display     Medical Decision Making:   History:   Old Medical Records: I decided to obtain old medical records.  Initial Assessment:   42 y/o F with HTN, DM2 and sickle cell trait presents with headache, body aches and sore throat  Ddx: covid, viral syndrome  On exam no evidence of strep pharyngitis  Pt well appearing, afebrile  covid test sent as well as istat  Differential Diagnosis:       Clinical Tests:   Lab Tests: Reviewed and Ordered  ED Management:  Covid test negative.  Slightly elevate glucose at 151 otherwise unremarkable bloodwork  Pt discharged to home. covid symptoms/isolation. Routine return precautions provided.           Scribe Attestation:   Scribe #1: I performed the above scribed service and the documentation accurately describes the services I performed. I attest to the accuracy of the note.    Attending Attestation:           Physician Attestation for Scribe:  Physician Attestation Statement for Scribe #1: I, Sridevi Espinoza, reviewed documentation, as scribed by Veronica Bernal in my presence, and it is both accurate and complete.                      Clinical Impression:   Final diagnoses:  [J02.9] Sore throat (Primary)  [Z20.822] COVID-19 virus RNA not detected          ED Disposition Condition    Discharge Stable        ED Prescriptions     None        Follow-up Information     Follow up With Specialties Details Why Contact Info Additional Information    [facility] Schedule an appointment as soon as possible for a visit in 1 week  [address]  [facility] [id number] [phone] [facility] Please park in surface lot and check in at central registration desk           Sridevi Espinoza MD  08/18/22 0483

## 2022-08-18 NOTE — ED NOTES
I-STAT Chem-8+ Results:   Value Reference Range   Sodium 141 136-145 mmol/L   Potassium  3.8 3.5-5.1 mmol/L   Chloride 102  mmol/L   Ionized Calcium 1.17 1.06-1.42 mmol/L   CO2 (measured) 26 23-29 mmol/L   Glucose 151  mg/dL   BUN 8 6-30 mg/dL   Creatinine 0.7 0.5-1.4 mg/dL   Hematocrit 36 36-54%

## 2022-08-22 NOTE — PLAN OF CARE
POC discussed with patient, verbalized understanding. Patient with uneventful night, slept off and on between care. Tolerated most of prep with good results from suppository. Kilo discontinued this am. VS stable.Lovenox. accuchecks.  Tolerating clears. No complaints. Call light at bedside. Tolerated full liquids and regular diet well large bm noted this shift dc to home

## 2022-09-15 ENCOUNTER — HOSPITAL ENCOUNTER (EMERGENCY)
Facility: HOSPITAL | Age: 42
Discharge: HOME | End: 2022-09-15
Attending: EMERGENCY MEDICINE
Payer: COMMERCIAL

## 2022-09-15 VITALS
BODY MASS INDEX: 40.34 KG/M2 | HEART RATE: 88 BPM | TEMPERATURE: 98 F | HEIGHT: 67 IN | OXYGEN SATURATION: 100 % | WEIGHT: 257 LBS | SYSTOLIC BLOOD PRESSURE: 145 MMHG | DIASTOLIC BLOOD PRESSURE: 75 MMHG | RESPIRATION RATE: 18 BRPM

## 2022-09-15 DIAGNOSIS — L50.9 URTICARIA: Primary | ICD-10-CM

## 2022-09-15 PROCEDURE — 99284 EMERGENCY DEPT VISIT MOD MDM: CPT | Mod: ,,, | Performed by: EMERGENCY MEDICINE

## 2022-09-15 PROCEDURE — 63600175 PHARM REV CODE 636 W HCPCS: Performed by: EMERGENCY MEDICINE

## 2022-09-15 PROCEDURE — 25000003 PHARM REV CODE 250: Performed by: EMERGENCY MEDICINE

## 2022-09-15 PROCEDURE — 99284 PR EMERGENCY DEPT VISIT,LEVEL IV: ICD-10-PCS | Mod: ,,, | Performed by: EMERGENCY MEDICINE

## 2022-09-15 PROCEDURE — 99283 EMERGENCY DEPT VISIT LOW MDM: CPT

## 2022-09-15 RX ORDER — PREDNISONE 20 MG/1
40 TABLET ORAL DAILY
Qty: 6 TABLET | Refills: 0 | Status: SHIPPED | OUTPATIENT
Start: 2022-09-15 | End: 2022-09-18

## 2022-09-15 RX ORDER — DIPHENHYDRAMINE HCL 25 MG
25 CAPSULE ORAL
Status: COMPLETED | OUTPATIENT
Start: 2022-09-15 | End: 2022-09-15

## 2022-09-15 RX ORDER — PREDNISONE 20 MG/1
40 TABLET ORAL DAILY
Qty: 6 TABLET | Refills: 0 | Status: SHIPPED | OUTPATIENT
Start: 2022-09-15 | End: 2022-09-15 | Stop reason: SDUPTHER

## 2022-09-15 RX ORDER — PREDNISONE 20 MG/1
40 TABLET ORAL
Status: COMPLETED | OUTPATIENT
Start: 2022-09-15 | End: 2022-09-15

## 2022-09-15 RX ADMIN — PREDNISONE 40 MG: 20 TABLET ORAL at 09:09

## 2022-09-15 RX ADMIN — DIPHENHYDRAMINE HYDROCHLORIDE 25 MG: 25 CAPSULE ORAL at 09:09

## 2022-09-16 NOTE — ED PROVIDER NOTES
Encounter Date: 9/15/2022    SCRIBE #1 NOTE: I, Aurora Holcomb, am scribing for, and in the presence of,  Juan Muhammad MD. I have scribed the entire note.     History     Chief Complaint   Patient presents with    Rash     Pt reports rash with itching to right neck and itching to throat that she first noticed yesterday. No swelling noted to throat in triage. Denies hx of anaphylaxis, N/V, SOB. She tried hydrocortisone cream at home without relief. Denies trying benadryl.      Time patient was seen by the provider: 9:36 PM      The patient is a 41 y.o. female with past medical history of sickle cell, DM type II, HTN who presents to the ED with a complaint of itchiness to face with onset yesterday. She explains that it feels like an allergic reaction. She used hydrocortisone cream but her symptoms persist. She denies use of new medications or soaps. She denies SOB. She went to her PCP yesterday at which time her glucose was normal.    The history is provided by the patient, the spouse and medical records. No  was used.   Review of patient's allergies indicates:   Allergen Reactions    Pcn [penicillins] Hives    Metformin Nausea And Vomiting     Past Medical History:   Diagnosis Date    Anemia     Diabetes mellitus     Diabetes mellitus, type 2     Hypertension     Sickle cell trait      Past Surgical History:   Procedure Laterality Date    CHOLECYSTECTOMY      HYSTERECTOMY      TUBAL LIGATION       Family History   Problem Relation Age of Onset    Diabetes Paternal Grandmother     Diabetes Father     Diabetes Mother     Hypertension Mother      Social History     Tobacco Use    Smoking status: Never    Smokeless tobacco: Never   Substance Use Topics    Alcohol use: Not Currently    Drug use: No     Review of Systems   Constitutional:  Negative for chills and fever.   HENT:  Negative for nosebleeds.    Eyes:  Negative for visual disturbance.   Respiratory:  Negative for shortness of breath.     Cardiovascular:  Negative for chest pain.   Gastrointestinal:  Negative for vomiting.   Genitourinary:  Negative for frequency.   Musculoskeletal:  Negative for joint swelling.   Skin:  Negative for rash.        + itchiness to face   Neurological:  Negative for numbness.   Hematological:  Does not bruise/bleed easily.   Psychiatric/Behavioral:  Negative for confusion.      Physical Exam     Initial Vitals [09/15/22 2035]   BP Pulse Resp Temp SpO2   (!) 145/75 88 18 97.7 °F (36.5 °C) 100 %      MAP       --         Physical Exam    Nursing note and vitals reviewed.  Constitutional: She appears well-developed and well-nourished. She is not diaphoretic. No distress.   HENT:   Head: Normocephalic and atraumatic.   Normal lips and throat   Eyes: Conjunctivae are normal.   Neck: Neck supple. No tracheal deviation present. No JVD present.   Normal range of motion.  Cardiovascular:  Normal rate, regular rhythm, normal heart sounds and intact distal pulses.           Pulmonary/Chest: Breath sounds normal. No stridor. No respiratory distress. She has no wheezes. She has no rhonchi. She has no rales.   Abdominal: Abdomen is soft. Bowel sounds are normal. She exhibits no distension. There is no abdominal tenderness. There is no rebound.   Musculoskeletal:         General: No edema.      Cervical back: Normal range of motion and neck supple.     Neurological: She is alert. She has normal strength. No sensory deficit. GCS score is 15. GCS eye subscore is 4. GCS verbal subscore is 5. GCS motor subscore is 6.   Skin: Skin is warm.   No hives or swelling   Psychiatric: She has a normal mood and affect.       ED Course   Procedures  Labs Reviewed - No data to display       Imaging Results    None          Medications   predniSONE tablet 40 mg (40 mg Oral Given 9/15/22 2152)   diphenhydrAMINE capsule 25 mg (25 mg Oral Given 9/15/22 2152)     Medical Decision Making:   History:   Old Medical Records: I decided to obtain old medical  records.  Initial Assessment:   Patient has history of allergic reactions with itching to the face. Will do short course of steroids and benadryl.  Differential Diagnosis:   Will consider allergic reaction  ED Management:  Will administer steroids and benadryl        Scribe Attestation:   Scribe #1: I performed the above scribed service and the documentation accurately describes the services I performed. I attest to the accuracy of the note.                   Clinical Impression:   Final diagnoses:  [L50.9] Urticaria (Primary)      ED Disposition Condition    Discharge Stable          ED Prescriptions       Medication Sig Dispense Start Date End Date Auth. Provider    predniSONE (DELTASONE) 20 MG tablet  (Status: Discontinued) Take 2 tablets (40 mg total) by mouth once daily. for 3 days 6 tablet 9/15/2022 9/15/2022 Juan Muhammad MD    predniSONE (DELTASONE) 20 MG tablet  (Status: Discontinued) Take 2 tablets (40 mg total) by mouth once daily. for 3 days 6 tablet 9/15/2022 9/15/2022 Juan Muhammad MD    predniSONE (DELTASONE) 20 MG tablet Take 2 tablets (40 mg total) by mouth once daily. for 3 days 6 tablet 9/15/2022 9/18/2022 Juan Muhammad MD          Follow-up Information       Follow up With Specialties Details Why Contact Info    Your Primary Care Physician  Schedule an appointment as soon as possible for a visit in 2 days      [facility] - Emergency Dept Emergency Medicine  If symptoms worsen [address] [facility] [id number]  [phone]             Juan Muhammad MD  09/16/22 0018

## 2022-09-16 NOTE — ED NOTES
Discharge home with family, states understanding to follow up as directed. Ambulates out of ED without difficulty. RX  sent to [facility] per request

## 2022-09-16 NOTE — FIRST PROVIDER EVALUATION
Medical screening examination initiated.  I have conducted a focused provider triage encounter, findings are as follows:    Brief history of present illness:  41-year-old woman with previous history of colitis presents to the ED for a 2 day history of generalized itching and scratchy throat.     There were no vitals filed for this visit.    Pertinent physical exam:  41-year-old  woman, no acute distress noted, tolerating secretions, no evidence of respiratory distress or significant facial/oral swelling    Brief workup plan:  Allergic reaction management    Preliminary workup initiated; this workup will be continued and followed by the physician or advanced practice provider that is assigned to the patient when roomed.

## 2022-09-16 NOTE — ED TRIAGE NOTES
Pt states noticing rash behind both ears and under chin yesterday morning. Pt states rash is itching with some swelling. Pt used hydrocortisone cream with no relief. Denies itching to tongue and throat. No trouble swallowing.

## 2022-11-09 ENCOUNTER — OFFICE VISIT (OUTPATIENT)
Dept: URGENT CARE | Facility: CLINIC | Age: 42
End: 2022-11-09
Payer: COMMERCIAL

## 2022-11-09 VITALS
HEIGHT: 67 IN | TEMPERATURE: 98 F | BODY MASS INDEX: 40.34 KG/M2 | OXYGEN SATURATION: 99 % | RESPIRATION RATE: 18 BRPM | SYSTOLIC BLOOD PRESSURE: 202 MMHG | WEIGHT: 257 LBS | DIASTOLIC BLOOD PRESSURE: 104 MMHG | HEART RATE: 77 BPM

## 2022-11-09 DIAGNOSIS — J06.9 VIRAL URI: Primary | ICD-10-CM

## 2022-11-09 DIAGNOSIS — R50.9 FEVER, UNSPECIFIED FEVER CAUSE: ICD-10-CM

## 2022-11-09 LAB
CTP QC/QA: YES
CTP QC/QA: YES
POC MOLECULAR INFLUENZA A AGN: NEGATIVE
POC MOLECULAR INFLUENZA B AGN: NEGATIVE
SARS-COV-2 AG RESP QL IA.RAPID: NEGATIVE

## 2022-11-09 PROCEDURE — 4010F PR ACE/ARB THEARPY RXD/TAKEN: ICD-10-PCS | Mod: CPTII,S$GLB,, | Performed by: NURSE PRACTITIONER

## 2022-11-09 PROCEDURE — 3080F PR MOST RECENT DIASTOLIC BLOOD PRESSURE >= 90 MM HG: ICD-10-PCS | Mod: CPTII,S$GLB,, | Performed by: NURSE PRACTITIONER

## 2022-11-09 PROCEDURE — 3080F DIAST BP >= 90 MM HG: CPT | Mod: CPTII,S$GLB,, | Performed by: NURSE PRACTITIONER

## 2022-11-09 PROCEDURE — 1159F MED LIST DOCD IN RCRD: CPT | Mod: CPTII,S$GLB,, | Performed by: NURSE PRACTITIONER

## 2022-11-09 PROCEDURE — 99203 PR OFFICE/OUTPT VISIT, NEW, LEVL III, 30-44 MIN: ICD-10-PCS | Mod: S$GLB,,, | Performed by: NURSE PRACTITIONER

## 2022-11-09 PROCEDURE — 99203 OFFICE O/P NEW LOW 30 MIN: CPT | Mod: S$GLB,,, | Performed by: NURSE PRACTITIONER

## 2022-11-09 PROCEDURE — 87502 POCT INFLUENZA A/B MOLECULAR: ICD-10-PCS | Mod: QW,S$GLB,, | Performed by: NURSE PRACTITIONER

## 2022-11-09 PROCEDURE — 1160F PR REVIEW ALL MEDS BY PRESCRIBER/CLIN PHARMACIST DOCUMENTED: ICD-10-PCS | Mod: CPTII,S$GLB,, | Performed by: NURSE PRACTITIONER

## 2022-11-09 PROCEDURE — 3077F PR MOST RECENT SYSTOLIC BLOOD PRESSURE >= 140 MM HG: ICD-10-PCS | Mod: CPTII,S$GLB,, | Performed by: NURSE PRACTITIONER

## 2022-11-09 PROCEDURE — 1159F PR MEDICATION LIST DOCUMENTED IN MEDICAL RECORD: ICD-10-PCS | Mod: CPTII,S$GLB,, | Performed by: NURSE PRACTITIONER

## 2022-11-09 PROCEDURE — 87502 INFLUENZA DNA AMP PROBE: CPT | Mod: QW,S$GLB,, | Performed by: NURSE PRACTITIONER

## 2022-11-09 PROCEDURE — 87811 SARS-COV-2 COVID19 W/OPTIC: CPT | Mod: QW,S$GLB,, | Performed by: NURSE PRACTITIONER

## 2022-11-09 PROCEDURE — 4010F ACE/ARB THERAPY RXD/TAKEN: CPT | Mod: CPTII,S$GLB,, | Performed by: NURSE PRACTITIONER

## 2022-11-09 PROCEDURE — 87811 SARS CORONAVIRUS 2 ANTIGEN POCT, MANUAL READ: ICD-10-PCS | Mod: QW,S$GLB,, | Performed by: NURSE PRACTITIONER

## 2022-11-09 PROCEDURE — 1160F RVW MEDS BY RX/DR IN RCRD: CPT | Mod: CPTII,S$GLB,, | Performed by: NURSE PRACTITIONER

## 2022-11-09 PROCEDURE — 3008F PR BODY MASS INDEX (BMI) DOCUMENTED: ICD-10-PCS | Mod: CPTII,S$GLB,, | Performed by: NURSE PRACTITIONER

## 2022-11-09 PROCEDURE — 3077F SYST BP >= 140 MM HG: CPT | Mod: CPTII,S$GLB,, | Performed by: NURSE PRACTITIONER

## 2022-11-09 PROCEDURE — 3008F BODY MASS INDEX DOCD: CPT | Mod: CPTII,S$GLB,, | Performed by: NURSE PRACTITIONER

## 2022-11-09 RX ORDER — FLUTICASONE PROPIONATE 50 MCG
2 SPRAY, SUSPENSION (ML) NASAL DAILY
Qty: 15.8 ML | Refills: 0 | Status: SHIPPED | OUTPATIENT
Start: 2022-11-09 | End: 2022-11-16

## 2022-11-09 NOTE — PATIENT INSTRUCTIONS
See additional patient Instructions provided    - Rest.    - Drink plenty of fluids.  - Viral upper respiratory infections typically run their course in 10-14 days.     - Tylenol or Ibuprofen as directed as needed for fever/pain. Avoid tylenol if you have a history of liver disease. Do not take ibuprofen if you have a history of GI bleeding, kidney disease, or if you take blood thinners.     - You can take over-the-counter claritin, zyrtec, allegra, or xyzal as directed. These are antihistamines that can help with runny nose, nasal congestion, sneezing, and helps to dry up post-nasal drip, which usually causes sore throat and cough.   - If you do NOT have high blood pressure, you may use a decongestant form (D)  of this medication (ie. Claritin- D, zyrtec-D, allegra-D) or if you do not take the D form, you can take sudafed (pseudoephedrine) over the counter, which is a decongestant. Do NOT take two decongestant (D) medications at the same time (such as mucinex-D and claritin-D or plain sudafed and claritin D)    - You can use Flonase (fluticasone) nasal spray as directed for sinus congestion and postnasal drip. This is a steroid nasal spray that works locally over time to decrease the inflammation in your nose/sinuses and help with allergic symptoms. This is not an quick- relief spray like afrin, but it works well if used daily.  Discontinue if you develop nose bleed  - use nasal saline prior to Flonase.  - Use Ocean Spray Nasal Saline 1-3 puffs each nostril every 2-3 hours then blow out onto tissue. This is to irrigate the nasal passage way to clear the sinus openings. Use until sinus problem resolved.    - you can take plain Mucinex (guaifenesin) 1200 mg twice a day to help loosen mucous.     -warm salt water gargles can help with sore throat    - warm tea with honey can help with cough. Honey is a natural cough suppressant.    - Dextromethorphan (DM) is a cough suppressant over the counter (ie. mucinex DM,  robitussin, delsym; dayquil/nyquil has DM as well.)    - Follow up with your PCP or specialty clinic as directed in the next 1-2 weeks if not improved or as needed.  You can call [phone] to schedule an appointment with the appropriate provider.      - Go to the ER if you develop new or worsening symptoms.     - You must understand that you have received an Urgent Care treatment only and that you may be released before all of your medical problems are known or treated.   - You, the patient, will arrange for follow up care as instructed.   - If your condition worsens or fails to improve we recommend that you receive another evaluation at the ER immediately or contact your PCP to discuss your concerns or return here.     Patient Instructions   - You must understand that you have received an Urgent Care treatment only and that you may be released before all of your medical problems are known or treated.   - You, the patient, will arrange for follow up care as instructed.   - If your condition worsens or fails to improve we recommend that you receive another evaluation at the ER immediately or contact your PCP to discuss your concerns or return here.     Advised on return/follow-up precautions. Advised on ER precautions. Answered all patient questions. Patient verbalized understanding and voiced agreement with current treatment plan.

## 2022-11-09 NOTE — PROGRESS NOTES
"Subjective:       Patient ID: Dee Felton is a 41 y.o. female.    Vitals:  height is 5' 7" (1.702 m) and weight is 116.6 kg (257 lb). Her temperature is 97.7 °F (36.5 °C). Her blood pressure is 202/104 (abnormal) and her pulse is 77. Her respiration is 18 and oxygen saturation is 99%.     Chief Complaint: Cough (Cough, body aches)    Patient complains of ear pain, body aches, sore throat, cough   Symptoms  on and off for two weeks     Cough  This is a new problem. The current episode started 1 to 4 weeks ago. The problem has been gradually worsening. The cough is Productive of sputum. Associated symptoms include ear pain, myalgias and a sore throat. Pertinent negatives include no chest pain, chills, fever, headaches, rash, shortness of breath or wheezing. Treatments tried: theraflu, day and nyquil, benadryl.     Constitution: Negative for chills, sweating, fatigue and fever.   HENT:  Positive for ear pain and sore throat. Negative for ear discharge, tinnitus, hearing loss, congestion, sinus pain, sinus pressure, trouble swallowing and voice change.    Neck: Negative for neck pain and painful lymph nodes.   Cardiovascular:  Negative for chest pain, palpitations and sob on exertion.   Respiratory:  Positive for cough. Negative for sputum production, shortness of breath and wheezing.    Gastrointestinal:  Positive for nausea. Negative for abdominal pain, vomiting and diarrhea.   Musculoskeletal:  Positive for muscle ache.   Skin:  Negative for color change, pale and rash.   Allergic/Immunologic: Negative for sneezing.   Neurological:  Negative for headaches, numbness and tingling.   Hematologic/Lymphatic: Negative for swollen lymph nodes.     Objective:      Physical Exam   Constitutional: She is oriented to person, place, and time. She appears well-developed. She is cooperative.  Non-toxic appearance. She does not appear ill. No distress.   HENT:   Head: Normocephalic and atraumatic.   Ears:   Right Ear: " Hearing, tympanic membrane, external ear and ear canal normal.   Left Ear: Hearing, tympanic membrane, external ear and ear canal normal.   Nose: Congestion present. No mucosal edema, rhinorrhea or nasal deformity. No epistaxis. Right sinus exhibits no maxillary sinus tenderness and no frontal sinus tenderness. Left sinus exhibits no maxillary sinus tenderness and no frontal sinus tenderness.   Mouth/Throat: Uvula is midline and mucous membranes are normal. No trismus in the jaw. Normal dentition. No uvula swelling. Posterior oropharyngeal erythema present. No oropharyngeal exudate or posterior oropharyngeal edema.   Eyes: Conjunctivae and lids are normal. Right eye exhibits no discharge. Left eye exhibits no discharge. No scleral icterus.   Neck: Trachea normal and phonation normal. Neck supple. No edema present. No erythema present. No neck rigidity present.   Cardiovascular: Normal rate, regular rhythm, normal heart sounds and normal pulses.   Pulmonary/Chest: Effort normal and breath sounds normal. No stridor. No respiratory distress. She has no decreased breath sounds. She has no wheezes. She has no rhonchi. She has no rales. She exhibits no tenderness.   Abdominal: Normal appearance.   Musculoskeletal: Normal range of motion.         General: No deformity. Normal range of motion.      Cervical back: She exhibits no tenderness.   Lymphadenopathy:     She has no cervical adenopathy.   Neurological: She is alert and oriented to person, place, and time. She exhibits normal muscle tone. Coordination normal.   Skin: Skin is warm, dry, intact, not diaphoretic and not pale.   Psychiatric: Her speech is normal and behavior is normal. Judgment and thought content normal.   Nursing note and vitals reviewed.      Results for orders placed or performed in visit on 11/09/22   POCT Influenza A/B Molecular   Result Value Ref Range    POC Molecular Influenza A Ag Negative Negative, Not Reported    POC Molecular Influenza B Ag  Negative Negative, Not Reported     Acceptable Yes    SARS Coronavirus 2 Antigen, POCT Manual Read   Result Value Ref Range    SARS Coronavirus 2 Antigen Negative Negative     Acceptable Yes        Assessment:       1. Viral URI    2. Fever, unspecified fever cause          Plan:         Viral URI  -     fluticasone propionate (FLONASE) 50 mcg/actuation nasal spray; 2 sprays (100 mcg total) by Each Nostril route once daily. for 7 days  Dispense: 15.8 mL; Refill: 0    Fever, unspecified fever cause  -     POCT Influenza A/B Molecular  -     SARS Coronavirus 2 Antigen, POCT Manual Read                   Patient Instructions   See additional patient Instructions provided    - Rest.    - Drink plenty of fluids.  - Viral upper respiratory infections typically run their course in 10-14 days.     - Tylenol or Ibuprofen as directed as needed for fever/pain. Avoid tylenol if you have a history of liver disease. Do not take ibuprofen if you have a history of GI bleeding, kidney disease, or if you take blood thinners.     - You can take over-the-counter claritin, zyrtec, allegra, or xyzal as directed. These are antihistamines that can help with runny nose, nasal congestion, sneezing, and helps to dry up post-nasal drip, which usually causes sore throat and cough.   - If you do NOT have high blood pressure, you may use a decongestant form (D)  of this medication (ie. Claritin- D, zyrtec-D, allegra-D) or if you do not take the D form, you can take sudafed (pseudoephedrine) over the counter, which is a decongestant. Do NOT take two decongestant (D) medications at the same time (such as mucinex-D and claritin-D or plain sudafed and claritin D)    - You can use Flonase (fluticasone) nasal spray as directed for sinus congestion and postnasal drip. This is a steroid nasal spray that works locally over time to decrease the inflammation in your nose/sinuses and help with allergic symptoms. This is not an  quick- relief spray like afrin, but it works well if used daily.  Discontinue if you develop nose bleed  - use nasal saline prior to Flonase.  - Use Ocean Spray Nasal Saline 1-3 puffs each nostril every 2-3 hours then blow out onto tissue. This is to irrigate the nasal passage way to clear the sinus openings. Use until sinus problem resolved.    - you can take plain Mucinex (guaifenesin) 1200 mg twice a day to help loosen mucous.     -warm salt water gargles can help with sore throat    - warm tea with honey can help with cough. Honey is a natural cough suppressant.    - Dextromethorphan (DM) is a cough suppressant over the counter (ie. mucinex DM, robitussin, delsym; dayquil/nyquil has DM as well.)    - Follow up with your PCP or specialty clinic as directed in the next 1-2 weeks if not improved or as needed.  You can call [phone] to schedule an appointment with the appropriate provider.      - Go to the ER if you develop new or worsening symptoms.     - You must understand that you have received an Urgent Care treatment only and that you may be released before all of your medical problems are known or treated.   - You, the patient, will arrange for follow up care as instructed.   - If your condition worsens or fails to improve we recommend that you receive another evaluation at the ER immediately or contact your PCP to discuss your concerns or return here.     Patient Instructions   - You must understand that you have received an Urgent Care treatment only and that you may be released before all of your medical problems are known or treated.   - You, the patient, will arrange for follow up care as instructed.   - If your condition worsens or fails to improve we recommend that you receive another evaluation at the ER immediately or contact your PCP to discuss your concerns or return here.     Advised on return/follow-up precautions. Advised on ER precautions. Answered all patient questions. Patient  verbalized understanding and voiced agreement with current treatment plan.

## 2022-12-16 DIAGNOSIS — R22.1 ENLARGEMENT OF NECK: Primary | ICD-10-CM

## 2022-12-21 ENCOUNTER — TELEPHONE (OUTPATIENT)
Dept: BARIATRICS | Facility: CLINIC | Age: 42
End: 2022-12-21
Payer: COMMERCIAL

## 2022-12-21 ENCOUNTER — HOSPITAL ENCOUNTER (OUTPATIENT)
Dept: RADIOLOGY | Facility: HOSPITAL | Age: 42
Discharge: HOME | End: 2022-12-21
Attending: FAMILY MEDICINE
Payer: COMMERCIAL

## 2022-12-21 DIAGNOSIS — R22.1 ENLARGEMENT OF NECK: ICD-10-CM

## 2022-12-21 PROCEDURE — 76536 US EXAM OF HEAD AND NECK: CPT | Mod: 26,,, | Performed by: RADIOLOGY

## 2022-12-21 PROCEDURE — 76536 US SOFT TISSUE HEAD NECK THYROID: ICD-10-PCS | Mod: 26,,, | Performed by: RADIOLOGY

## 2022-12-21 PROCEDURE — 76536 US EXAM OF HEAD AND NECK: CPT | Mod: TC

## 2022-12-21 NOTE — TELEPHONE ENCOUNTER
----- Message from Ashish Rao sent at 12/21/2022 11:45 AM CST -----  Contact: [phone]  Pt is calling in ref to her 11 juli appt -- states no one has called -- please reach out to pt [phone]

## 2022-12-22 ENCOUNTER — HOSPITAL ENCOUNTER (OUTPATIENT)
Dept: RADIOLOGY | Facility: HOSPITAL | Age: 42
Discharge: HOME | End: 2022-12-22
Attending: PODIATRIST
Payer: COMMERCIAL

## 2022-12-22 ENCOUNTER — OFFICE VISIT (OUTPATIENT)
Dept: PODIATRY | Facility: CLINIC | Age: 42
End: 2022-12-22
Payer: COMMERCIAL

## 2022-12-22 VITALS
HEART RATE: 90 BPM | DIASTOLIC BLOOD PRESSURE: 89 MMHG | WEIGHT: 257.06 LBS | HEIGHT: 67 IN | SYSTOLIC BLOOD PRESSURE: 146 MMHG | BODY MASS INDEX: 40.35 KG/M2

## 2022-12-22 DIAGNOSIS — E11.9 TYPE 2 DIABETES MELLITUS WITHOUT COMPLICATION, WITH LONG-TERM CURRENT USE OF INSULIN: ICD-10-CM

## 2022-12-22 DIAGNOSIS — Z79.4 TYPE 2 DIABETES MELLITUS WITHOUT COMPLICATION, WITH LONG-TERM CURRENT USE OF INSULIN: ICD-10-CM

## 2022-12-22 DIAGNOSIS — L97.511 RIGHT FOOT ULCER, LIMITED TO BREAKDOWN OF SKIN: ICD-10-CM

## 2022-12-22 DIAGNOSIS — E11.9 TYPE 2 DIABETES MELLITUS WITHOUT COMPLICATION, WITH LONG-TERM CURRENT USE OF INSULIN: Primary | ICD-10-CM

## 2022-12-22 DIAGNOSIS — Z79.4 TYPE 2 DIABETES MELLITUS WITHOUT COMPLICATION, WITH LONG-TERM CURRENT USE OF INSULIN: Primary | ICD-10-CM

## 2022-12-22 PROCEDURE — 73630 XR FOOT COMPLETE 3 VIEW RIGHT: ICD-10-PCS | Mod: 26,RT,, | Performed by: RADIOLOGY

## 2022-12-22 PROCEDURE — 3077F SYST BP >= 140 MM HG: CPT | Mod: CPTII,S$GLB,, | Performed by: PODIATRIST

## 2022-12-22 PROCEDURE — 3077F PR MOST RECENT SYSTOLIC BLOOD PRESSURE >= 140 MM HG: ICD-10-PCS | Mod: CPTII,S$GLB,, | Performed by: PODIATRIST

## 2022-12-22 PROCEDURE — 99999 PR PBB SHADOW E&M-EST. PATIENT-LVL IV: CPT | Mod: PBBFAC,,, | Performed by: PODIATRIST

## 2022-12-22 PROCEDURE — 1159F MED LIST DOCD IN RCRD: CPT | Mod: CPTII,S$GLB,, | Performed by: PODIATRIST

## 2022-12-22 PROCEDURE — 3079F PR MOST RECENT DIASTOLIC BLOOD PRESSURE 80-89 MM HG: ICD-10-PCS | Mod: CPTII,S$GLB,, | Performed by: PODIATRIST

## 2022-12-22 PROCEDURE — 3008F PR BODY MASS INDEX (BMI) DOCUMENTED: ICD-10-PCS | Mod: CPTII,S$GLB,, | Performed by: PODIATRIST

## 2022-12-22 PROCEDURE — 99203 OFFICE O/P NEW LOW 30 MIN: CPT | Mod: S$GLB,,, | Performed by: PODIATRIST

## 2022-12-22 PROCEDURE — 1160F PR REVIEW ALL MEDS BY PRESCRIBER/CLIN PHARMACIST DOCUMENTED: ICD-10-PCS | Mod: CPTII,S$GLB,, | Performed by: PODIATRIST

## 2022-12-22 PROCEDURE — 3008F BODY MASS INDEX DOCD: CPT | Mod: CPTII,S$GLB,, | Performed by: PODIATRIST

## 2022-12-22 PROCEDURE — 73630 X-RAY EXAM OF FOOT: CPT | Mod: TC,PN,RT

## 2022-12-22 PROCEDURE — 99999 PR PBB SHADOW E&M-EST. PATIENT-LVL IV: ICD-10-PCS | Mod: PBBFAC,,, | Performed by: PODIATRIST

## 2022-12-22 PROCEDURE — 4010F ACE/ARB THERAPY RXD/TAKEN: CPT | Mod: CPTII,S$GLB,, | Performed by: PODIATRIST

## 2022-12-22 PROCEDURE — 73630 X-RAY EXAM OF FOOT: CPT | Mod: 26,RT,, | Performed by: RADIOLOGY

## 2022-12-22 PROCEDURE — 4010F PR ACE/ARB THEARPY RXD/TAKEN: ICD-10-PCS | Mod: CPTII,S$GLB,, | Performed by: PODIATRIST

## 2022-12-22 PROCEDURE — 99203 PR OFFICE/OUTPT VISIT, NEW, LEVL III, 30-44 MIN: ICD-10-PCS | Mod: S$GLB,,, | Performed by: PODIATRIST

## 2022-12-22 PROCEDURE — 1159F PR MEDICATION LIST DOCUMENTED IN MEDICAL RECORD: ICD-10-PCS | Mod: CPTII,S$GLB,, | Performed by: PODIATRIST

## 2022-12-22 PROCEDURE — 1160F RVW MEDS BY RX/DR IN RCRD: CPT | Mod: CPTII,S$GLB,, | Performed by: PODIATRIST

## 2022-12-22 PROCEDURE — 3079F DIAST BP 80-89 MM HG: CPT | Mod: CPTII,S$GLB,, | Performed by: PODIATRIST
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
## 2023-01-12 ENCOUNTER — OFFICE VISIT (OUTPATIENT)
Dept: PODIATRY | Facility: CLINIC | Age: 43
End: 2023-01-12
Payer: COMMERCIAL

## 2023-01-12 VITALS
BODY MASS INDEX: 40.35 KG/M2 | SYSTOLIC BLOOD PRESSURE: 140 MMHG | WEIGHT: 257.06 LBS | HEART RATE: 87 BPM | DIASTOLIC BLOOD PRESSURE: 85 MMHG | HEIGHT: 67 IN

## 2023-01-12 DIAGNOSIS — Z79.4 TYPE 2 DIABETES MELLITUS WITHOUT COMPLICATION, WITH LONG-TERM CURRENT USE OF INSULIN: Primary | ICD-10-CM

## 2023-01-12 DIAGNOSIS — E11.9 TYPE 2 DIABETES MELLITUS WITHOUT COMPLICATION, WITH LONG-TERM CURRENT USE OF INSULIN: Primary | ICD-10-CM

## 2023-01-12 DIAGNOSIS — L97.511 RIGHT FOOT ULCER, LIMITED TO BREAKDOWN OF SKIN: ICD-10-CM

## 2023-01-12 PROCEDURE — 1160F PR REVIEW ALL MEDS BY PRESCRIBER/CLIN PHARMACIST DOCUMENTED: ICD-10-PCS | Mod: CPTII,S$GLB,, | Performed by: PODIATRIST

## 2023-01-12 PROCEDURE — 3079F PR MOST RECENT DIASTOLIC BLOOD PRESSURE 80-89 MM HG: ICD-10-PCS | Mod: CPTII,S$GLB,, | Performed by: PODIATRIST

## 2023-01-12 PROCEDURE — 3008F BODY MASS INDEX DOCD: CPT | Mod: CPTII,S$GLB,, | Performed by: PODIATRIST

## 2023-01-12 PROCEDURE — 1160F RVW MEDS BY RX/DR IN RCRD: CPT | Mod: CPTII,S$GLB,, | Performed by: PODIATRIST

## 2023-01-12 PROCEDURE — 1159F MED LIST DOCD IN RCRD: CPT | Mod: CPTII,S$GLB,, | Performed by: PODIATRIST

## 2023-01-12 PROCEDURE — 3079F DIAST BP 80-89 MM HG: CPT | Mod: CPTII,S$GLB,, | Performed by: PODIATRIST

## 2023-01-12 PROCEDURE — 99999 PR PBB SHADOW E&M-EST. PATIENT-LVL IV: CPT | Mod: PBBFAC,,, | Performed by: PODIATRIST

## 2023-01-12 PROCEDURE — 3008F PR BODY MASS INDEX (BMI) DOCUMENTED: ICD-10-PCS | Mod: CPTII,S$GLB,, | Performed by: PODIATRIST

## 2023-01-12 PROCEDURE — 99213 PR OFFICE/OUTPT VISIT, EST, LEVL III, 20-29 MIN: ICD-10-PCS | Mod: S$GLB,,, | Performed by: PODIATRIST

## 2023-01-12 PROCEDURE — 3077F PR MOST RECENT SYSTOLIC BLOOD PRESSURE >= 140 MM HG: ICD-10-PCS | Mod: CPTII,S$GLB,, | Performed by: PODIATRIST

## 2023-01-12 PROCEDURE — 99213 OFFICE O/P EST LOW 20 MIN: CPT | Mod: S$GLB,,, | Performed by: PODIATRIST

## 2023-01-12 PROCEDURE — 99999 PR PBB SHADOW E&M-EST. PATIENT-LVL IV: ICD-10-PCS | Mod: PBBFAC,,, | Performed by: PODIATRIST

## 2023-01-12 PROCEDURE — 1159F PR MEDICATION LIST DOCUMENTED IN MEDICAL RECORD: ICD-10-PCS | Mod: CPTII,S$GLB,, | Performed by: PODIATRIST

## 2023-01-12 PROCEDURE — 3077F SYST BP >= 140 MM HG: CPT | Mod: CPTII,S$GLB,, | Performed by: PODIATRIST

## 2023-01-12 NOTE — PROGRESS NOTES
"Subjective:      Patient ID: Dee Felton is a 42 y.o. female.    Chief Complaint:   Foot Injury (Right foot great toe ), Follow-up (PcpKay may 12/12/22), and Diabetes Mellitus    Dee is a 42 y.o. female who presents to the podiatry clinic f/u ulcer/right foot pain.    Patient doing well she relates she is been applying ointment to the area.  No drainage just seems to be a small area of scab or callus.  No pain to toe  No new complaints  No recent pedicure    Past Medical History:   Diagnosis Date    Anemia     Diabetes mellitus     Diabetes mellitus, type 2     Hypertension     Sickle cell trait      Past Surgical History:   Procedure Laterality Date    CHOLECYSTECTOMY      HYSTERECTOMY      TUBAL LIGATION       Current Outpatient Medications on File Prior to Visit   Medication Sig Dispense Refill    amlodipine-benazepril 10-20mg (LOTREL) 10-20 mg per capsule       atorvastatin (LIPITOR) 20 MG tablet Take 20 mg by mouth.      bisacodyL (DULCOLAX) 10 mg Supp Place 1 suppository (10 mg total) rectally daily as needed (constipation). 12 suppository 3    cetirizine (ZYRTEC) 10 MG tablet Take 10 mg by mouth.      gabapentin (NEURONTIN) 300 MG capsule Take 800 mg by mouth 2 (two) times daily.      gabapentin (NEURONTIN) 300 MG capsule Take 300 mg by mouth.      hydrochlorothiazide (HYDRODIURIL) 25 MG tablet       insulin glargine, TOUJEO, (TOUJEO) 300 unit/mL (1.5 mL) InPn pen Inject 46 Units into the skin.      linaCLOtide (LINZESS) 290 mcg Cap capsule Take 1 capsule (290 mcg total) by mouth before breakfast. 30 capsule 1    PEN NEEDLE 31 gauge x 1/4" Ndle       polyethylene glycol (GLYCOLAX) 17 gram PwPk Take 17 g by mouth 2 (two) times daily. 60 each 1    [DISCONTINUED] glipiZIDE (GLUCOTROL) 10 MG tablet Take 10 mg by mouth 2 (two) times daily.      [DISCONTINUED] isoniazid (NYDRAZID) 300 MG Tab Take 1 tablet (300 mg total) by mouth once daily. One tablet daily for prevention of tuberculosis 90 " "tablet 3    [DISCONTINUED] nystatin-triamcinolone (MYCOLOG II) cream Apply to affected area 2 times daily 30 g 1    [DISCONTINUED] olmesartan-amLODIPin-hcthiazid 40-5-25 mg Tab Take by mouth.      [DISCONTINUED] triamterene-hydrochlorothiazide 37.5-25 mg (MAXZIDE-25) 37.5-25 mg per tablet TAKE 1 TABLET BY MOUTH ONCE DAILY IN THE MORNING FOR 30 DAYS       No current facility-administered medications on file prior to visit.     Review of patient's allergies indicates:   Allergen Reactions    Pcn [penicillins] Hives    Metformin Nausea And Vomiting       Review of Systems   Constitutional: Negative for chills, decreased appetite, fever, malaise/fatigue, night sweats, weight gain and weight loss.   Cardiovascular:  Negative for chest pain, claudication, dyspnea on exertion, leg swelling, palpitations and syncope.   Respiratory:  Negative for cough and shortness of breath.    Endocrine: Negative for cold intolerance and heat intolerance.   Hematologic/Lymphatic: Negative for bleeding problem. Does not bruise/bleed easily.   Skin:  Positive for nail changes. Negative for color change, dry skin, flushing, itching, poor wound healing, rash, skin cancer, suspicious lesions and unusual hair distribution.   Musculoskeletal:  Negative for arthritis, back pain, falls, gout, joint pain, joint swelling, muscle cramps, muscle weakness, myalgias, neck pain and stiffness.   Gastrointestinal:  Negative for diarrhea, nausea and vomiting.   Neurological:  Positive for numbness and paresthesias. Negative for dizziness, focal weakness, light-headedness, tremors, vertigo and weakness.   Psychiatric/Behavioral:  Negative for altered mental status and depression. The patient does not have insomnia.    Allergic/Immunologic: Negative.          Objective:       Vitals:    01/12/23 1514   BP: (!) 140/85   Pulse: 87   Weight: 116.6 kg (257 lb 0.9 oz)   Height: 5' 7" (1.702 m)   PainSc: 0-No pain   116.6 kg (257 lb 0.9 oz)     Physical " Exam  Vitals reviewed.   Constitutional:       General: She is not in acute distress.     Appearance: She is well-developed. She is not ill-appearing, toxic-appearing or diaphoretic.      Comments: Flip-flops      Cardiovascular:      Pulses:           Dorsalis pedis pulses are 2+ on the right side and 2+ on the left side.        Posterior tibial pulses are 2+ on the right side and 2+ on the left side.   Musculoskeletal:         General: No deformity.      Right lower leg: No edema.      Left lower leg: No edema.      Right ankle: Normal.      Right Achilles Tendon: Normal.      Left ankle: Normal.      Left Achilles Tendon: Normal.      Right foot: Decreased range of motion. No deformity, tenderness or bony tenderness.      Left foot: Decreased range of motion. No deformity, tenderness or bony tenderness.      Comments: No pain on palpation or range of motion   Feet:      Right foot:      Skin integrity: Skin breakdown present. No ulcer, blister, erythema, warmth, callus or dry skin.      Toenail Condition: Right toenails are abnormally thick.      Left foot:      Skin integrity: No ulcer, blister, skin breakdown, erythema, warmth, callus or dry skin.      Toenail Condition: Left toenails are abnormally thick.      Comments: Mild decrease to digits    Superficial ulcer right dorsal hallux = resolved  HPK with mild hemosiderin upon sharp debridement intact epithelium  No soi  No significant depth    Skin:     General: Skin is warm.      Capillary Refill: Capillary refill takes 2 to 3 seconds.      Coloration: Skin is not pale.      Findings: No erythema or rash.   Neurological:      Mental Status: She is alert and oriented to person, place, and time.      Sensory: Sensory deficit present.   Psychiatric:         Attention and Perception: Attention normal.         Mood and Affect: Mood normal.         Speech: Speech normal.         Behavior: Behavior normal.         Thought Content: Thought content normal.          Cognition and Memory: Cognition normal.         Judgment: Judgment normal.       X-Ray Foot Complete Right  Narrative: EXAMINATION:  XR FOOT COMPLETE 3 VIEW RIGHT    CLINICAL HISTORY:  right big toe ulcer.;. Type 2 diabetes mellitus without complications    TECHNIQUE:  AP, lateral, and oblique views of the right foot were performed.    COMPARISON:  July 20, 2022    FINDINGS:  No acute fractures, bone lesions, or bone destruction demonstrated.  Preserved bone density.  Preserved joint spaces.  No opaque soft tissue foreign body.  Soft tissue swelling dorsally at the level of the metatarsals.  Impression: No acute or significant bony findings.    Electronically signed by: Tyler Mahajan  Date:    12/22/2022  Time:    10:20         Assessment:       Encounter Diagnoses   Name Primary?    Type 2 diabetes mellitus without complication, with long-term current use of insulin Yes    Right foot ulcer, limited to breakdown of skin            Plan:       Dee was seen today for foot injury, follow-up and diabetes mellitus.    Diagnoses and all orders for this visit:    Type 2 diabetes mellitus without complication, with long-term current use of insulin    Right foot ulcer, limited to breakdown of skin        I counseled the patient on her conditions, their implications and medical management.    =Shoe inspection. Diabetic Foot Education. Patient reminded of the importance of good nutrition and blood sugar control to help prevent podiatric complications of diabetes. Patient instructed on proper foot hygeine. We discussed wearing proper shoe gear, daily foot inspections, never walking without protective shoe gear, never putting sharp instruments to feet      Doing well declines diabetic shoes; will consider      - With patient's permission, Utilizing a #15 scalpel, I trimmed the corns and calluses at the above mentioned location.      The patient will continue to monitor the areas daily, inspect the feet, wear protective shoe  gear when ambulatory, and moisturizer to maintain skin integrity.     - ulcer has resolved       - Return to clinic in 1 year  or sooner if problems arise                 Follow up in about 1 year (around 1/12/2024).

## 2023-01-30 ENCOUNTER — TELEPHONE (OUTPATIENT)
Dept: BARIATRICS | Facility: CLINIC | Age: 43
End: 2023-01-30
Payer: COMMERCIAL

## 2023-01-30 NOTE — TELEPHONE ENCOUNTER
Notified patient of the date & time of financial phone call appt.  Pt aware appt is a telephone call.    Dashboard updated  Appt. 01/31/2023

## 2023-01-31 ENCOUNTER — TELEPHONE (OUTPATIENT)
Dept: BARIATRICS | Facility: CLINIC | Age: 43
End: 2023-01-31
Payer: COMMERCIAL

## 2023-01-31 ENCOUNTER — HOSPITAL ENCOUNTER (EMERGENCY)
Facility: HOSPITAL | Age: 43
Discharge: HOME | End: 2023-01-31
Attending: EMERGENCY MEDICINE
Payer: COMMERCIAL

## 2023-01-31 VITALS
RESPIRATION RATE: 18 BRPM | SYSTOLIC BLOOD PRESSURE: 192 MMHG | DIASTOLIC BLOOD PRESSURE: 98 MMHG | TEMPERATURE: 99 F | BODY MASS INDEX: 41.59 KG/M2 | OXYGEN SATURATION: 99 % | HEART RATE: 86 BPM | HEIGHT: 67 IN | WEIGHT: 265 LBS

## 2023-01-31 DIAGNOSIS — T78.40XA ALLERGIC REACTION, INITIAL ENCOUNTER: Primary | ICD-10-CM

## 2023-01-31 LAB
ALBUMIN SERPL BCP-MCNC: 3.7 G/DL (ref 3.5–5.2)
ALP SERPL-CCNC: 76 U/L (ref 55–135)
ALT SERPL W/O P-5'-P-CCNC: 18 U/L (ref 10–44)
ANION GAP SERPL CALC-SCNC: 10 MMOL/L (ref 8–16)
AST SERPL-CCNC: 20 U/L (ref 10–40)
BACTERIA #/AREA URNS AUTO: NORMAL /HPF
BASOPHILS # BLD AUTO: 0.04 K/UL (ref 0–0.2)
BASOPHILS NFR BLD: 0.6 % (ref 0–1.9)
BILIRUB SERPL-MCNC: 0.3 MG/DL (ref 0.1–1)
BILIRUB UR QL STRIP: NEGATIVE
BUN SERPL-MCNC: 8 MG/DL (ref 6–20)
CALCIUM SERPL-MCNC: 8.9 MG/DL (ref 8.7–10.5)
CHLORIDE SERPL-SCNC: 105 MMOL/L (ref 95–110)
CLARITY UR REFRACT.AUTO: CLEAR
CO2 SERPL-SCNC: 25 MMOL/L (ref 23–29)
COLOR UR AUTO: COLORLESS
CREAT SERPL-MCNC: 0.6 MG/DL (ref 0.5–1.4)
DIFFERENTIAL METHOD: ABNORMAL
EOSINOPHIL # BLD AUTO: 0.1 K/UL (ref 0–0.5)
EOSINOPHIL NFR BLD: 1.6 % (ref 0–8)
ERYTHROCYTE [DISTWIDTH] IN BLOOD BY AUTOMATED COUNT: 12.9 % (ref 11.5–14.5)
EST. GFR  (NO RACE VARIABLE): >60 ML/MIN/1.73 M^2
GLUCOSE SERPL-MCNC: 130 MG/DL (ref 70–110)
GLUCOSE UR QL STRIP: ABNORMAL
HCT VFR BLD AUTO: 35.5 % (ref 37–48.5)
HCV AB SERPL QL IA: NORMAL
HGB BLD-MCNC: 12.2 G/DL (ref 12–16)
HGB UR QL STRIP: NEGATIVE
HIV 1+2 AB+HIV1 P24 AG SERPL QL IA: NORMAL
IMM GRANULOCYTES # BLD AUTO: 0.02 K/UL (ref 0–0.04)
IMM GRANULOCYTES NFR BLD AUTO: 0.3 % (ref 0–0.5)
KETONES UR QL STRIP: NEGATIVE
LEUKOCYTE ESTERASE UR QL STRIP: NEGATIVE
LYMPHOCYTES # BLD AUTO: 2.6 K/UL (ref 1–4.8)
LYMPHOCYTES NFR BLD: 38 % (ref 18–48)
MCH RBC QN AUTO: 29.9 PG (ref 27–31)
MCHC RBC AUTO-ENTMCNC: 34.4 G/DL (ref 32–36)
MCV RBC AUTO: 87 FL (ref 82–98)
MICROSCOPIC COMMENT: NORMAL
MONOCYTES # BLD AUTO: 0.6 K/UL (ref 0.3–1)
MONOCYTES NFR BLD: 8.3 % (ref 4–15)
NEUTROPHILS # BLD AUTO: 3.5 K/UL (ref 1.8–7.7)
NEUTROPHILS NFR BLD: 51.2 % (ref 38–73)
NITRITE UR QL STRIP: NEGATIVE
NRBC BLD-RTO: 0 /100 WBC
PH UR STRIP: 7 [PH] (ref 5–8)
PLATELET # BLD AUTO: 283 K/UL (ref 150–450)
PMV BLD AUTO: 10.8 FL (ref 9.2–12.9)
POCT GLUCOSE: 134 MG/DL (ref 70–110)
POTASSIUM SERPL-SCNC: 3.3 MMOL/L (ref 3.5–5.1)
PROT SERPL-MCNC: 7.5 G/DL (ref 6–8.4)
PROT UR QL STRIP: NEGATIVE
RBC # BLD AUTO: 4.08 M/UL (ref 4–5.4)
RBC #/AREA URNS AUTO: 0 /HPF (ref 0–4)
SODIUM SERPL-SCNC: 140 MMOL/L (ref 136–145)
SP GR UR STRIP: 1.01 (ref 1–1.03)
SQUAMOUS #/AREA URNS AUTO: 0 /HPF
URN SPEC COLLECT METH UR: ABNORMAL
WBC # BLD AUTO: 6.77 K/UL (ref 3.9–12.7)
YEAST UR QL AUTO: NORMAL

## 2023-01-31 PROCEDURE — 80053 COMPREHEN METABOLIC PANEL: CPT

## 2023-01-31 PROCEDURE — 99283 EMERGENCY DEPT VISIT LOW MDM: CPT | Mod: 25

## 2023-01-31 PROCEDURE — 99284 EMERGENCY DEPT VISIT MOD MDM: CPT | Mod: ,,, | Performed by: EMERGENCY MEDICINE

## 2023-01-31 PROCEDURE — 81001 URINALYSIS AUTO W/SCOPE: CPT

## 2023-01-31 PROCEDURE — 82962 GLUCOSE BLOOD TEST: CPT

## 2023-01-31 PROCEDURE — 87389 HIV-1 AG W/HIV-1&-2 AB AG IA: CPT | Performed by: PHYSICIAN ASSISTANT

## 2023-01-31 PROCEDURE — 25000003 PHARM REV CODE 250

## 2023-01-31 PROCEDURE — 99284 PR EMERGENCY DEPT VISIT,LEVEL IV: ICD-10-PCS | Mod: ,,, | Performed by: EMERGENCY MEDICINE

## 2023-01-31 PROCEDURE — 86803 HEPATITIS C AB TEST: CPT | Performed by: PHYSICIAN ASSISTANT

## 2023-01-31 PROCEDURE — 85025 COMPLETE CBC W/AUTO DIFF WBC: CPT

## 2023-01-31 RX ORDER — AMLODIPINE AND BENAZEPRIL HYDROCHLORIDE 5; 10 MG/1; MG/1
2 CAPSULE ORAL DAILY
Status: DISCONTINUED | OUTPATIENT
Start: 2023-01-31 | End: 2023-01-31 | Stop reason: HOSPADM

## 2023-01-31 RX ORDER — DIPHENHYDRAMINE HCL 25 MG
25 CAPSULE ORAL
Status: COMPLETED | OUTPATIENT
Start: 2023-01-31 | End: 2023-01-31

## 2023-01-31 RX ORDER — DIPHENHYDRAMINE HCL 50 MG
50 CAPSULE ORAL EVERY 6 HOURS PRN
Qty: 20 CAPSULE | Refills: 0 | Status: SHIPPED | OUTPATIENT
Start: 2023-01-31

## 2023-01-31 RX ADMIN — DIPHENHYDRAMINE HYDROCHLORIDE 25 MG: 25 CAPSULE ORAL at 06:01

## 2023-01-31 RX ADMIN — POTASSIUM BICARBONATE 40 MEQ: 391 TABLET, EFFERVESCENT ORAL at 08:01

## 2023-01-31 NOTE — ED TRIAGE NOTES
Dee Felton, a 42 y.o. female presents to the ED w/ complaint of possible allergic reaction. Pt reports she is itchy and reports swollen lips.  Denies SOB or trouble swallowing.    Triage note:  Chief Complaint   Patient presents with    Allergic Reaction     She takes toujeo, thinks she is having a reaction to this, not a new med, but c/o facial itching and burning that has been going on but severe since last night, she has been taking benazepril as well for years, lips are swollen     Review of patient's allergies indicates:   Allergen Reactions    Pcn [penicillins] Hives    Metformin Nausea And Vomiting     Past Medical History:   Diagnosis Date    Anemia     Diabetes mellitus     Diabetes mellitus, type 2     Hypertension     Sickle cell trait

## 2023-01-31 NOTE — DISCHARGE INSTRUCTIONS
Diagnosis:   1. Allergic reaction, initial encounter      Home Care Instructions:  - Take your medication for your high blood pressure and diabetes  - You can take Benadryl every 6 hours as needed for allergic reaction    Follow-Up Plan:  - Follow-up with: Primary care provider within 1 week    Return to the Emergency Department for symptoms including but not limited to: worsening symptoms, shortness of breath or chest pain, vomiting with inability to hold down fluids, or other concerning symptoms.

## 2023-01-31 NOTE — Clinical Note
"Dee"Eder Felton was seen and treated in our emergency department on 1/31/2023.  She may return to work on 02/01/2023.       If you have any questions or concerns, please don't hesitate to call.      BE Lemons RN RN    "

## 2023-01-31 NOTE — TELEPHONE ENCOUNTER
----- Message from Lalita Saul sent at 1/31/2023  9:15 AM CST -----  Regarding: pt advice  Contact: [phone]  LOUIE WASHINGTON calling regarding   (message) for # waiting on financial appt that was scheduled for 9am. [facility] call [phone]

## 2023-01-31 NOTE — MEDICAL/APP STUDENT
History     Chief Complaint   Patient presents with    Allergic Reaction     She takes toujeo, thinks she is having a reaction to this, not a new med, but c/o facial itching and burning that has been going on but severe since last night, she has been taking benazepril as well for years, lips are swollen     Deeviri Felton 42 yr old female with PMH of HTN, diabetes, sickle presents to us due to an allergic reaction. Patient states that recently she has been taking her glargine every day rather than every other day for the past couple of weeks and has noticed a rash breaking out on her face and her arms and noticed occasional lip swelling. She also describes a burning and itching sensation on her arms and legs as well. Reports that her lips are swollen as well. She has tried benadryl and over the counter topical steroid to alleviate the rash and lip swelling. Last night, she could not sleep due to burning and itching on arms and legs and had more noticeable lip swelling which is why she presented to the ED. Denies chest pain, SOB, difficulty breathing, abdominal pain, nausea, vomiting, diarrhea. Has chronic constipation and takes bisacodyl for it. Also reports increased urinary frequency.        Past Medical History:   Diagnosis Date    Anemia     Diabetes mellitus     Diabetes mellitus, type 2     Hypertension     Sickle cell trait        Past Surgical History:   Procedure Laterality Date    CHOLECYSTECTOMY      HYSTERECTOMY      TUBAL LIGATION         Family History   Problem Relation Age of Onset    Diabetes Paternal Grandmother     Diabetes Father     Diabetes Mother     Hypertension Mother        Social History     Tobacco Use    Smoking status: Never    Smokeless tobacco: Never   Substance Use Topics    Alcohol use: Not Currently    Drug use: No       Review of Systems   Eyes: Negative.    Respiratory: Negative.     Cardiovascular:  Negative for chest pain, palpitations and leg swelling.  "  Gastrointestinal:  Positive for constipation and nausea. Negative for diarrhea and vomiting. Abdominal pain: umbilical.  Endocrine: Negative.    Genitourinary: Negative.    Musculoskeletal: Negative.    Skin: Negative.    Allergic/Immunologic: Negative.    Neurological: Negative.    Hematological: Negative.    Psychiatric/Behavioral: Negative.       Physical Exam   BP (!) 225/105 (BP Location: Right arm, Patient Position: Sitting)   Pulse 95   Temp 98 °F (36.7 °C) (Oral)   Resp 18   Ht 5' 7" (1.702 m)   Wt 120.2 kg (265 lb)   LMP 04/21/2020 (Approximate)   SpO2 100%   BMI 41.50 kg/m²     Physical Exam    ED Course           "

## 2023-01-31 NOTE — ED NOTES
"Pt reports not taking BP medication for last couple days. When asked why pt states "adalberto been trying to figure out what Im allergic too."  "

## 2023-01-31 NOTE — ED PROVIDER NOTES
Encounter Date: 1/31/2023       History     Chief Complaint   Patient presents with    Allergic Reaction     She takes toujeo, thinks she is having a reaction to this, not a new med, but c/o facial itching and burning that has been going on but severe since last night, she has been taking benazepril as well for years, lips are swollen     42 yr old female with PMH of HTN, diabetes, sickle cell trait presents to the ED for chief complaint of an allergic reaction. Patient states that recently she has been taking her glargine every day rather than every other day for the past couple of weeks and has noticed a rash breaking out on her face and her arms and noticed occasional lip swelling.  Patient denies using new skin products and eating new foods. She also describes a burning and itching sensation on her arms and legs as well. Reports that her lips are swollen as well. She has tried benadryl and over the counter topical steroid to alleviate the rash and lip swelling. Last night, she could not sleep due to burning and itching on arms and legs and had more noticeable lip swelling which is why she presented to the ED. Denies chest pain, SOB, difficulty breathing, abdominal pain, nausea, vomiting, diarrhea. Has chronic constipation and takes bisacodyl for it. Also reports increased urinary frequency, denies dysuria.    The history is provided by the patient. No  was used.   Review of patient's allergies indicates:   Allergen Reactions    Pcn [penicillins] Hives    Metformin Nausea And Vomiting     Past Medical History:   Diagnosis Date    Anemia     Diabetes mellitus     Diabetes mellitus, type 2     Hypertension     Sickle cell trait      Past Surgical History:   Procedure Laterality Date    CHOLECYSTECTOMY      HYSTERECTOMY      TUBAL LIGATION       Family History   Problem Relation Age of Onset    Diabetes Paternal Grandmother     Diabetes Father     Diabetes Mother     Hypertension Mother       Social History     Tobacco Use    Smoking status: Never    Smokeless tobacco: Never   Substance Use Topics    Alcohol use: Not Currently    Drug use: No     Review of Systems   Constitutional:  Negative for fever.   Respiratory:  Negative for shortness of breath.    Cardiovascular:  Negative for chest pain.   Gastrointestinal:  Positive for constipation. Negative for nausea and vomiting.   Endocrine: Positive for polyuria.   Genitourinary:  Negative for difficulty urinating and dysuria.   Skin:  Positive for rash.   Allergic/Immunologic: Negative for environmental allergies and food allergies.   Neurological:  Negative for headaches.     Physical Exam     Initial Vitals [01/31/23 0520]   BP Pulse Resp Temp SpO2   (!) 225/105 95 18 98 °F (36.7 °C) 100 %      MAP       --         Physical Exam    Nursing note and vitals reviewed.  Constitutional: She appears well-developed and well-nourished.   HENT:   Head: Normocephalic.   Mouth/Throat: Oropharynx is clear and moist.   No facial or oral edema.   Eyes: Conjunctivae are normal. No scleral icterus.   Neck: Neck supple.   Normal range of motion.  Cardiovascular:  Normal rate, regular rhythm and normal heart sounds.           Pulmonary/Chest: Breath sounds normal. No respiratory distress. She has no wheezes. She has no rhonchi. She has no rales.   Abdominal: Abdomen is soft. She exhibits no distension. There is abdominal tenderness.   Generalized abdominal tenderness. There is no rebound and no guarding.   Musculoskeletal:         General: No edema. Normal range of motion.      Cervical back: Normal range of motion and neck supple.     Neurological: She is alert.   Skin: Skin is warm. Capillary refill takes less than 2 seconds. Rash noted.   Dark papular rash noted on left side of face.   Psychiatric: She has a normal mood and affect.       ED Course   Procedures  Labs Reviewed   CBC W/ AUTO DIFFERENTIAL - Abnormal; Notable for the following components:       Result  Value    Hematocrit 35.5 (*)     All other components within normal limits   COMPREHENSIVE METABOLIC PANEL - Abnormal; Notable for the following components:    Potassium 3.3 (*)     Glucose 130 (*)     All other components within normal limits   URINALYSIS, REFLEX TO URINE CULTURE - Abnormal; Notable for the following components:    Color, UA Colorless (*)     Glucose, UA 4+ (*)     All other components within normal limits    Narrative:     Specimen Source->Urine   POCT GLUCOSE - Abnormal; Notable for the following components:    POCT Glucose 134 (*)     All other components within normal limits   HIV 1 / 2 ANTIBODY    Narrative:     Release to patient->Immediate   HEPATITIS C ANTIBODY    Narrative:     Release to patient->Immediate   URINALYSIS MICROSCOPIC    Narrative:     Specimen Source->Urine   POCT GLUCOSE MONITORING CONTINUOUS          Imaging Results    None          Medications   amlodipine-benazepril 5-10 mg per capsule 2 capsule (has no administration in time range)   diphenhydrAMINE capsule 25 mg (25 mg Oral Given 1/31/23 0601)   potassium bicarbonate disintegrating tablet 40 mEq (40 mEq Oral Given 1/31/23 0825)     Medical Decision Making:   Initial Assessment:   42 yr old female with PMH of HTN, diabetes, sickle cell trait presents to the ED for chief complaint of an allergic reaction.  Patient is laying in bed in no apparent distress.  Differential Diagnosis:   - Allergic reaction  - Anaphylaxis: Low suspicion of diagnosis. No dyspnea or facial edema.  - Hyperglycemia  - Constipation  Clinical Tests:   Lab Tests: Ordered and Reviewed  ED Management:  Patient presents with concern for allergic reaction.  Obtained history and physical exam. Patient states that she has been intermittently taking her glargine and started taking her glargine every day, then developed a rash and facial swelling.  There is no facial or edema appreciated on physical exam.  She does not have any dyspnea, nausea, or vomiting.   She endorses pruritus, ordered Benadryl. She endorses generalized abdominal pain and increased urination.  However, patient has a history of chronic constipation.  Ordered labs for POCT glucose, CBC, CMP, and UA.  Patient has hypokalemia, ordered potassium bicarbonate.  Patient reassessed and she states that her pruritus has improved.  Please refer to ED Course for additional details.    Discussed with patient that there is a low likelihood that her glargine is causing her rash and I recommend patient to continue taking her medication for diabetes management. Discussed that patient can take Benadryl if an allergic reaction occurs and to take her home antihypertensive medications.  Discussed follow up with primary care provider and provided precautions for when to return to the emergency department.  Discharged patient to home.            ED Course as of 01/31/23 0905 Tue Jan 31, 2023   0658 Potassium(!): 3.3 [MD]      ED Course User Index  [MD] Jose Galvin MD                 Clinical Impression:   Final diagnoses:  [T78.40XA] Allergic reaction, initial encounter (Primary)        ED Disposition Condition    Discharge Stable          ED Prescriptions       Medication Sig Dispense Start Date End Date Auth. Provider    diphenhydrAMINE (BENADRYL) 50 MG capsule Take 1 capsule (50 mg total) by mouth every 6 (six) hours as needed for Itching or Allergies. 20 capsule 1/31/2023 -- Jose Galvin MD          Follow-up Information       Follow up With Specialties Details Why Contact Info    Dionte Morales MD Family Medicine Schedule an appointment as soon as possible for a visit in 1 week  [address]  [facility] [address]  [phone]               Jose Galvin MD  Resident  01/31/23 0906

## 2023-02-09 ENCOUNTER — LAB VISIT (OUTPATIENT)
Dept: LAB | Facility: HOSPITAL | Age: 43
End: 2023-02-09
Attending: STUDENT IN AN ORGANIZED HEALTH CARE EDUCATION/TRAINING PROGRAM
Payer: COMMERCIAL

## 2023-02-09 ENCOUNTER — OFFICE VISIT (OUTPATIENT)
Dept: ALLERGY | Facility: CLINIC | Age: 43
End: 2023-02-09
Payer: COMMERCIAL

## 2023-02-09 VITALS — SYSTOLIC BLOOD PRESSURE: 132 MMHG | HEART RATE: 88 BPM | OXYGEN SATURATION: 97 % | DIASTOLIC BLOOD PRESSURE: 89 MMHG

## 2023-02-09 DIAGNOSIS — L29.9 PRURITUS: ICD-10-CM

## 2023-02-09 DIAGNOSIS — J31.0 RHINITIS, UNSPECIFIED TYPE: ICD-10-CM

## 2023-02-09 DIAGNOSIS — M25.50 ARTHRALGIA, UNSPECIFIED JOINT: ICD-10-CM

## 2023-02-09 DIAGNOSIS — R21 FACIAL RASH: ICD-10-CM

## 2023-02-09 DIAGNOSIS — R22.9 SUBCUTANEOUS NODULES: ICD-10-CM

## 2023-02-09 DIAGNOSIS — R22.9 SUBCUTANEOUS NODULES, GENERALIZED: ICD-10-CM

## 2023-02-09 DIAGNOSIS — R21 FACIAL RASH: Primary | ICD-10-CM

## 2023-02-09 LAB
CCP AB SER IA-ACNC: <0.5 U/ML
CRP SERPL-MCNC: 5.6 MG/L (ref 0–8.2)
ERYTHROCYTE [SEDIMENTATION RATE] IN BLOOD BY PHOTOMETRIC METHOD: 5 MM/HR (ref 0–36)
RHEUMATOID FACT SERPL-ACNC: <13 IU/ML (ref 0–15)

## 2023-02-09 PROCEDURE — 86038 ANTINUCLEAR ANTIBODIES: CPT | Performed by: STUDENT IN AN ORGANIZED HEALTH CARE EDUCATION/TRAINING PROGRAM

## 2023-02-09 PROCEDURE — 99204 PR OFFICE/OUTPT VISIT, NEW, LEVL IV, 45-59 MIN: ICD-10-PCS | Mod: S$GLB,,, | Performed by: STUDENT IN AN ORGANIZED HEALTH CARE EDUCATION/TRAINING PROGRAM

## 2023-02-09 PROCEDURE — 3079F PR MOST RECENT DIASTOLIC BLOOD PRESSURE 80-89 MM HG: ICD-10-PCS | Mod: CPTII,S$GLB,, | Performed by: STUDENT IN AN ORGANIZED HEALTH CARE EDUCATION/TRAINING PROGRAM

## 2023-02-09 PROCEDURE — 99999 PR PBB SHADOW E&M-EST. PATIENT-LVL IV: ICD-10-PCS | Mod: PBBFAC,,, | Performed by: STUDENT IN AN ORGANIZED HEALTH CARE EDUCATION/TRAINING PROGRAM

## 2023-02-09 PROCEDURE — 86140 C-REACTIVE PROTEIN: CPT | Performed by: STUDENT IN AN ORGANIZED HEALTH CARE EDUCATION/TRAINING PROGRAM

## 2023-02-09 PROCEDURE — 86235 NUCLEAR ANTIGEN ANTIBODY: CPT | Mod: 59 | Performed by: STUDENT IN AN ORGANIZED HEALTH CARE EDUCATION/TRAINING PROGRAM

## 2023-02-09 PROCEDURE — 86039 ANTINUCLEAR ANTIBODIES (ANA): CPT | Performed by: STUDENT IN AN ORGANIZED HEALTH CARE EDUCATION/TRAINING PROGRAM

## 2023-02-09 PROCEDURE — 85652 RBC SED RATE AUTOMATED: CPT | Performed by: STUDENT IN AN ORGANIZED HEALTH CARE EDUCATION/TRAINING PROGRAM

## 2023-02-09 PROCEDURE — 86003 ALLG SPEC IGE CRUDE XTRC EA: CPT | Performed by: STUDENT IN AN ORGANIZED HEALTH CARE EDUCATION/TRAINING PROGRAM

## 2023-02-09 PROCEDURE — 36415 COLL VENOUS BLD VENIPUNCTURE: CPT | Performed by: STUDENT IN AN ORGANIZED HEALTH CARE EDUCATION/TRAINING PROGRAM

## 2023-02-09 PROCEDURE — 1159F PR MEDICATION LIST DOCUMENTED IN MEDICAL RECORD: ICD-10-PCS | Mod: CPTII,S$GLB,, | Performed by: STUDENT IN AN ORGANIZED HEALTH CARE EDUCATION/TRAINING PROGRAM

## 2023-02-09 PROCEDURE — 1160F PR REVIEW ALL MEDS BY PRESCRIBER/CLIN PHARMACIST DOCUMENTED: ICD-10-PCS | Mod: CPTII,S$GLB,, | Performed by: STUDENT IN AN ORGANIZED HEALTH CARE EDUCATION/TRAINING PROGRAM

## 2023-02-09 PROCEDURE — 1159F MED LIST DOCD IN RCRD: CPT | Mod: CPTII,S$GLB,, | Performed by: STUDENT IN AN ORGANIZED HEALTH CARE EDUCATION/TRAINING PROGRAM

## 2023-02-09 PROCEDURE — 3075F SYST BP GE 130 - 139MM HG: CPT | Mod: CPTII,S$GLB,, | Performed by: STUDENT IN AN ORGANIZED HEALTH CARE EDUCATION/TRAINING PROGRAM

## 2023-02-09 PROCEDURE — 86431 RHEUMATOID FACTOR QUANT: CPT | Performed by: STUDENT IN AN ORGANIZED HEALTH CARE EDUCATION/TRAINING PROGRAM

## 2023-02-09 PROCEDURE — 86200 CCP ANTIBODY: CPT | Performed by: STUDENT IN AN ORGANIZED HEALTH CARE EDUCATION/TRAINING PROGRAM

## 2023-02-09 PROCEDURE — 99999 PR PBB SHADOW E&M-EST. PATIENT-LVL IV: CPT | Mod: PBBFAC,,, | Performed by: STUDENT IN AN ORGANIZED HEALTH CARE EDUCATION/TRAINING PROGRAM

## 2023-02-09 PROCEDURE — 86003 ALLG SPEC IGE CRUDE XTRC EA: CPT | Mod: 59 | Performed by: STUDENT IN AN ORGANIZED HEALTH CARE EDUCATION/TRAINING PROGRAM

## 2023-02-09 PROCEDURE — 3079F DIAST BP 80-89 MM HG: CPT | Mod: CPTII,S$GLB,, | Performed by: STUDENT IN AN ORGANIZED HEALTH CARE EDUCATION/TRAINING PROGRAM

## 2023-02-09 PROCEDURE — 99204 OFFICE O/P NEW MOD 45 MIN: CPT | Mod: S$GLB,,, | Performed by: STUDENT IN AN ORGANIZED HEALTH CARE EDUCATION/TRAINING PROGRAM

## 2023-02-09 PROCEDURE — 1160F RVW MEDS BY RX/DR IN RCRD: CPT | Mod: CPTII,S$GLB,, | Performed by: STUDENT IN AN ORGANIZED HEALTH CARE EDUCATION/TRAINING PROGRAM

## 2023-02-09 PROCEDURE — 3075F PR MOST RECENT SYSTOLIC BLOOD PRESS GE 130-139MM HG: ICD-10-PCS | Mod: CPTII,S$GLB,, | Performed by: STUDENT IN AN ORGANIZED HEALTH CARE EDUCATION/TRAINING PROGRAM

## 2023-02-09 RX ORDER — HYDROCORTISONE 25 MG/G
OINTMENT TOPICAL
Qty: 28.35 G | Refills: 3 | Status: SHIPPED | OUTPATIENT
Start: 2023-02-09

## 2023-02-09 RX ORDER — GABAPENTIN 800 MG/1
800 TABLET ORAL EVERY 12 HOURS
COMMUNITY
Start: 2023-01-18

## 2023-02-09 RX ORDER — DULAGLUTIDE 4.5 MG/.5ML
4.5 INJECTION, SOLUTION SUBCUTANEOUS WEEKLY
COMMUNITY
Start: 2023-01-19 | End: 2024-03-16

## 2023-02-09 NOTE — LETTER
February 9, 2023      [facility] [address] [place] [address]  Phone: [phone]  Fax: [phone]       Patient: Dee Felton   YOB: 1980  Date of Visit: 02/09/2023    To Whom It May Concern:    Orquidea Felton  was at [facility] on 02/09/2023. The patient may return to work tomorrow 2/10/23 with no restrictions. If you have any questions or concerns, or if I can be of further assistance, please do not hesitate to contact me.    Sincerely,    Laila Cummings LPN

## 2023-02-09 NOTE — PROGRESS NOTES
ALLERGY & IMMUNOLOGY CLINIC - INITIAL CONSULTATION      HISTORY OF PRESENT ILLNESS     Patient ID: Dee Felton is a 42 y.o. female    CC: recurring facial rash associated with recurring subcutaneous nodules of her upper extremities.    HPI: Dee Felton is a 42 y.o. female with DM2, HTN, and sickle cell trait presenting for a recurring facial rash and upper extremity subcutaneous nodules.    She was referred by Dionte Morales MD.    She says she has always had problems with rashes on and off for years, but the most recent episode was the worst. This episode started maybe 2 weeks ago. She reports she had a rash on her face. It cleared up, then she broke out again. Left dark marks that are still there.  She says the rash looked like she had sunburn. She says there were also little bumps. It was itchy and a little painful. It kept her up all night. She said she started taking benadryl and using hydrocortisone. Maybe took a 3-4 days to clear up each time (had broken out 2 weeks ago and again last week).   She reports she had pitting edema of the hands and feet with this episode. Her hands and feet felt itchy too.     She says she has been getting rashes for 5 years, since she has been on her glargine. She is taking it now. She doesn't have the rash right now, but she is itchy all over. The itching also comes and goes. She says has knots under her arms that come and go. She says they aren't visible, but you can feel them. They are hard. The knots itch sometimes. The knots in her arms tend to flare when she has a flare of the facial rash. The knots always seem to occur in her forearms, but at different spots. They take longer than the rash to go away, maybe 1-2 weeks.  No mucosal lesions.    She reports she gets flares more often recently. Seems to happen a couple of times per week. Used to happen maybe once per month. She says she had gotten used to it. But this last time was worse.     She  says she has seen an outside dermatologist ([facility]) for a cyst, and they had talked about these symptoms. She says one time she broke out and it was flaky, and they took a scraping, but she never got results.    She also breaks out when she spends time in the sun. 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     She doesn't wear makeup typically and hasn't noticed it leads to a flare on the rare occasions she does wear it. In the shower, she uses pantene in hair, which doesn't correlate with her flares. She uses dove soap on both face and body. She uses danii-butter on face sometimes. She knows that getting eyebrows waxed breaks her out on her eyebrows only, but she hasn't had her eyebrows waxed recently.    She says during the most recent episode, she did have some shortness of breath. She says it seemed to stem from her throat. Felt like her throat was closing in. She says her heart was beating fast. No wheezing noticed.   She otherwise doesn't get shortness of breath.     She has had a hysterectomy, so she doesn't get menstrual periods. When the rash was occurring once per month, it didn't happen at the same time every month and wasn't predictable.     Sometimes she gets joint pains. She has never seen a rheumatologist.     She reports she gets congestion, rhinorrhea, ear and throat pruritus.   Patient endorses congestion, sneezing (sometimes), rhinorrhea, post nasal drip, cough, nasal pruritus, ocular pruritus.  Symptoms are perennial.   Symptoms come and go. Controlled with cetirizine.  There is no noticeable difference between indoors and outdoors.   She says symptoms are controlled with cetirizine. She is not currently using nasal sprays.    Hydrocortisone helps with the facial rash. She thinks the zyrtec helps with the pruritus.    She says when she was younger, she used to break out in hives.     MEDICAL HISTORY     Vaccines:    Immunization History   Administered Date(s) Administered  "   COVID-19, MRNA, LN-S, PF (MODERNA FULL 0.5 ML DOSE) 03/22/2021, 04/19/2021    COVID-19, MRNA, LN-S, PF (Pfizer) (Purple Cap) 12/21/2021     Medical Hx:   Patient Active Problem List   Diagnosis    Type 2 diabetes mellitus without complication, with long-term current use of insulin    Essential hypertension    Obesity    Stercoral colitis     Surgical Hx:   Past Surgical History:   Procedure Laterality Date    CHOLECYSTECTOMY      HYSTERECTOMY      TUBAL LIGATION       Medications:   Current Outpatient Medications on File Prior to Visit   Medication Sig Dispense Refill    amlodipine-benazepril 10-20mg (LOTREL) 10-20 mg per capsule       atorvastatin (LIPITOR) 20 MG tablet Take 20 mg by mouth.      cetirizine (ZYRTEC) 10 MG tablet Take 10 mg by mouth.      gabapentin (NEURONTIN) 300 MG capsule Take 800 mg by mouth 2 (two) times daily.      hydrochlorothiazide (HYDRODIURIL) 25 MG tablet       insulin glargine, TOUJEO, (TOUJEO) 300 unit/mL (1.5 mL) InPn pen Inject 46 Units into the skin.      linaCLOtide (LINZESS) 290 mcg Cap capsule Take 1 capsule (290 mcg total) by mouth before breakfast. 30 capsule 1    PEN NEEDLE 31 gauge x 1/4" Ndle       bisacodyL (DULCOLAX) 10 mg Supp Place 1 suppository (10 mg total) rectally daily as needed (constipation). (Patient not taking: Reported on 2/9/2023) 12 suppository 3    diphenhydrAMINE (BENADRYL) 50 MG capsule Take 1 capsule (50 mg total) by mouth every 6 (six) hours as needed for Itching or Allergies. (Patient not taking: Reported on 2/9/2023) 20 capsule 0    gabapentin (NEURONTIN) 300 MG capsule Take 300 mg by mouth.      gabapentin (NEURONTIN) 800 MG tablet Take 800 mg by mouth every 12 (twelve) hours.      polyethylene glycol (GLYCOLAX) 17 gram PwPk Take 17 g by mouth 2 (two) times daily. (Patient not taking: Reported on 2/9/2023) 60 each 1    TRULICITY 4.5 mg/0.5 mL pen injector Inject 4.5 mg into the skin once a week.      [DISCONTINUED] glipiZIDE (GLUCOTROL) 10 MG " tablet Take 10 mg by mouth 2 (two) times daily.      [DISCONTINUED] isoniazid (NYDRAZID) 300 MG Tab Take 1 tablet (300 mg total) by mouth once daily. One tablet daily for prevention of tuberculosis 90 tablet 3    [DISCONTINUED] nystatin-triamcinolone (MYCOLOG II) cream Apply to affected area 2 times daily 30 g 1    [DISCONTINUED] olmesartan-amLODIPin-hcthiazid 40-5-25 mg Tab Take by mouth.      [DISCONTINUED] triamterene-hydrochlorothiazide 37.5-25 mg (MAXZIDE-25) 37.5-25 mg per tablet TAKE 1 TABLET BY MOUTH ONCE DAILY IN THE MORNING FOR 30 DAYS       No current facility-administered medications on file prior to visit.     H/o Asthma: denies  H/o Rhinitis: endorses    Drug Allergies:   Review of patient's allergies indicates:   Allergen Reactions    Pcn [penicillins] Hives    Metformin Nausea And Vomiting     Env/Occ:   Pets: no  Occupation: techer    Social Hx:   Social History     Tobacco Use    Smoking status: Never     Passive exposure: Current    Smokeless tobacco: Never    Tobacco comments:      smoke but not around her.    Substance Use Topics    Alcohol use: Not Currently    Drug use: No     Family Hx:   Family History   Problem Relation Age of Onset    Eczema Mother     Diabetes Mother     Hypertension Mother     Diabetes Father     Diabetes Paternal Grandmother     Eczema Daughter     Asthma Daughter     Eczema Daughter     Eczema Grandchild     Asthma Grandchild       PHYSICAL EXAM     VS: /89 (BP Location: Right arm, Patient Position: Sitting, BP Method: Medium (Automatic))   Pulse 88   LMP 04/21/2020 (Approximate)   SpO2 97%   GENERAL: Alert, NAD, well-appearing, cooperative  EYES: EOMI, no conjunctival injection, no discharge, no infraorbital shiners  EARS: external auditory canals normal B/L, TM normal B/L  NOSE: NT 2 + B/L, no stringing mucus, no polyps visualized  ORAL: MMM, no ulcers, no thrush  NECK: no thyromegaly, no LAD  LUNGS: CTAB, no w/r/c, no increased WOB  HEART: RRR,  normal S1/S2, no m/g/r  EXTREMITIES: No LE edema  DERM: patches of hyperpigmentation of face; palpable (not visible) scattered subcutaneous nodules which felt to be about 1-2 cm in diameter, firm, and slightly mobile (I palpated at least 4 separate nodules).  NEURO: normal speech, normal gait, no facial asymmetry     LABORATORY STUDIES     Component      Latest Ref Rng & Units 1/31/2023   WBC      3.90 - 12.70 K/uL 6.77   RBC      4.00 - 5.40 M/uL 4.08   Hemoglobin      12.0 - 16.0 g/dL 12.2   Hematocrit      37.0 - 48.5 % 35.5 (L)   MCV      82 - 98 fL 87   MCH      27.0 - 31.0 pg 29.9   MCHC      32.0 - 36.0 g/dL 34.4   RDW      11.5 - 14.5 % 12.9   Platelets      150 - 450 K/uL 283   MPV      9.2 - 12.9 fL 10.8   Immature Granulocytes      0.0 - 0.5 % 0.3   Gran # (ANC)      1.8 - 7.7 K/uL 3.5   Immature Grans (Abs)      0.00 - 0.04 K/uL 0.02   Lymph #      1.0 - 4.8 K/uL 2.6   Mono #      0.3 - 1.0 K/uL 0.6   Eos #      0.0 - 0.5 K/uL 0.1   Baso #      0.00 - 0.20 K/uL 0.04   Differential Method       Automated   Sodium      136 - 145 mmol/L 140   Potassium      3.5 - 5.1 mmol/L 3.3 (L)   Chloride      95 - 110 mmol/L 105   CO2      23 - 29 mmol/L 25   Glucose      70 - 110 mg/dL 130 (H)   BUN      6 - 20 mg/dL 8   Creatinine      0.5 - 1.4 mg/dL 0.6   Calcium      8.7 - 10.5 mg/dL 8.9   PROTEIN TOTAL      6.0 - 8.4 g/dL 7.5   Albumin      3.5 - 5.2 g/dL 3.7   BILIRUBIN TOTAL      0.1 - 1.0 mg/dL 0.3   Alkaline Phosphatase      55 - 135 U/L 76   AST      10 - 40 U/L 20   ALT      10 - 44 U/L 18   HIV 1/2 Ag/Ab      Non-reactive Non-reactive   Hepatitis C Ab      Non-reactive Non-reactive     8/31/22:      TSH - wnl     RPR - not reactive     ALLERGEN TESTING     Immunocaps: Ordered     IMAGING & OTHER DIAGNOSTICS     CXR 1 view 1/28/21:  FINDINGS:  Cardiac silhouette is normal in size.  Lungs are symmetrically expanded.  No evidence of focal consolidative process, pneumothorax, or significant pleural effusion.   No acute osseous abnormality identified.  Impression:  No acute cardiopulmonary process identified.     CHART REVIEW     Reviewed ED note, labs, imaging.     ASSESSMENT & PLAN     Dee Felton is a 42 y.o. female with     # Recurrent facial rash associated with pruritus and subcutaneous nodules of forearms: Started about 5 years ago. The flares of the facial rash and forearm nodules tend to occur at the same time (rash takes 3-4 days to resolve, nodules take 1-2 weeks to resolve). The nodules aren't visible, but they are palpable on exam. She describes the rash as like a sunburn with small bumps, pruritic and a little painful. The most recent episode was the worst she has experienced, and left facial hyperpigmentation behind. Flares are becoming more frequent, up to a couple times per week (used to be about once per month). She also gets generalized pruritus and especially pruritus over the nodules. Hydrocortisone 1% helps with the facial rash, and cetirizine helps with the pruritus. I am unsure of the etiology of her symptoms. If it weren't for the nodules, I would think the description of her facial rash sounds most like allergic contact dermatitis. The nodules raise suspicion for a rheumatologic condition such as rheumatoid arthritis (she does endorse joint pains, but rheumatoid nodules tend to be more fixed and slow to develop) or relapsing polychondritis (which can have recurring moving subcutaneous nodules, but she didn't complain of any ear symptoms other than pruritus). Counseled that her symptoms aren't consistent with food allergy. She is concerned that her symptoms started around the same time she started her insulin glargine (toujeo) years ago. I do not think the pattern of her symptoms is consistent with a medication she takes consistently, but advised she could discuss switching insulins with her pcp or endocrinologist.   -checking STEPHANIE, RF, anti-CCP, ESR, and CRP.  -referring to  dermatology.  -once labs result, will also consider rheumatology referral.   -for the recurring facial rash, prescribing hydrocortisone 2.5% ointment BID prn.  -for the pruritus, advised she can increase her cetirizine to 10 mg BID.    # Rhinitis and ocular pruritus: controlled with daily cetirizine.  -immunocaps for aeroallergens ordered.  -continue cetirizine.    Follow up: as needed      Villa Martell MD  Allergy/Immunology

## 2023-02-10 LAB
ANA PATTERN 1: NORMAL
ANA PATTERN 2: NORMAL
ANA SER QL IF: POSITIVE
ANA TITER 2: NORMAL
ANA TITR SER IF: NORMAL {TITER}

## 2023-02-14 LAB
A ALTERNATA IGE QN: <0.1 KU/L
A FUMIGATUS IGE QN: <0.1 KU/L
ANTI SM ANTIBODY: 0.08 RATIO (ref 0–0.99)
ANTI SM/RNP ANTIBODY: 0.08 RATIO (ref 0–0.99)
ANTI-SM INTERPRETATION: NEGATIVE
ANTI-SM/RNP INTERPRETATION: NEGATIVE
ANTI-SSA ANTIBODY: 0.07 RATIO (ref 0–0.99)
ANTI-SSA INTERPRETATION: NEGATIVE
ANTI-SSB ANTIBODY: 0.07 RATIO (ref 0–0.99)
ANTI-SSB INTERPRETATION: NEGATIVE
BERMUDA GRASS IGE QN: <0.1 KU/L
CAT DANDER IGE QN: <0.1 KU/L
CEDAR IGE QN: <0.1 KU/L
D FARINAE IGE QN: <0.1 KU/L
D PTERONYSS IGE QN: <0.1 KU/L
DEPRECATED A ALTERNATA IGE RAST QL: NORMAL
DEPRECATED A FUMIGATUS IGE RAST QL: NORMAL
DEPRECATED BERMUDA GRASS IGE RAST QL: NORMAL
DEPRECATED CAT DANDER IGE RAST QL: NORMAL
DEPRECATED CEDAR IGE RAST QL: NORMAL
DEPRECATED D FARINAE IGE RAST QL: NORMAL
DEPRECATED D PTERONYSS IGE RAST QL: NORMAL
DEPRECATED DOG DANDER IGE RAST QL: NORMAL
DEPRECATED ELDER IGE RAST QL: NORMAL
DEPRECATED ENGL PLANTAIN IGE RAST QL: NORMAL
DEPRECATED PECAN/HICK TREE IGE RAST QL: NORMAL
DEPRECATED ROACH IGE RAST QL: NORMAL
DEPRECATED TIMOTHY IGE RAST QL: NORMAL
DEPRECATED WEST RAGWEED IGE RAST QL: NORMAL
DEPRECATED WHITE OAK IGE RAST QL: NORMAL
DOG DANDER IGE QN: <0.1 KU/L
DSDNA AB SER-ACNC: NORMAL [IU]/ML
ELDER IGE QN: <0.1 KU/L
ENGL PLANTAIN IGE QN: <0.1 KU/L
PECAN/HICK TREE IGE QN: <0.1 KU/L
ROACH IGE QN: <0.1 KU/L
TIMOTHY IGE QN: <0.1 KU/L
WEST RAGWEED IGE QN: <0.1 KU/L
WHITE OAK IGE QN: <0.1 KU/L

## 2023-02-16 ENCOUNTER — OFFICE VISIT (OUTPATIENT)
Dept: DERMATOLOGY | Facility: CLINIC | Age: 43
End: 2023-02-16
Payer: COMMERCIAL

## 2023-02-16 DIAGNOSIS — R21 FACIAL RASH: ICD-10-CM

## 2023-02-16 DIAGNOSIS — R21 RASH OF FACE: ICD-10-CM

## 2023-02-16 DIAGNOSIS — Z76.89 ENCOUNTER FOR SKIN CARE: ICD-10-CM

## 2023-02-16 DIAGNOSIS — L81.9 HYPERPIGMENTATION: Primary | ICD-10-CM

## 2023-02-16 PROCEDURE — 1159F MED LIST DOCD IN RCRD: CPT | Mod: CPTII,S$GLB,, | Performed by: DERMATOLOGY

## 2023-02-16 PROCEDURE — 99999 PR PBB SHADOW E&M-EST. PATIENT-LVL III: CPT | Mod: PBBFAC,,, | Performed by: DERMATOLOGY

## 2023-02-16 PROCEDURE — 99204 OFFICE O/P NEW MOD 45 MIN: CPT | Mod: S$GLB,,, | Performed by: DERMATOLOGY

## 2023-02-16 PROCEDURE — 99999 PR PBB SHADOW E&M-EST. PATIENT-LVL III: ICD-10-PCS | Mod: PBBFAC,,, | Performed by: DERMATOLOGY

## 2023-02-16 PROCEDURE — 1159F PR MEDICATION LIST DOCUMENTED IN MEDICAL RECORD: ICD-10-PCS | Mod: CPTII,S$GLB,, | Performed by: DERMATOLOGY

## 2023-02-16 PROCEDURE — 99204 PR OFFICE/OUTPT VISIT, NEW, LEVL IV, 45-59 MIN: ICD-10-PCS | Mod: S$GLB,,, | Performed by: DERMATOLOGY

## 2023-02-16 RX ORDER — HYDROQUINONE 40 MG/G
CREAM TOPICAL 2 TIMES DAILY
Qty: 30 G | Refills: 1 | Status: SHIPPED | OUTPATIENT
Start: 2023-02-16 | End: 2023-03-18

## 2023-02-16 NOTE — PROGRESS NOTES
Subjective:       Patient ID:  Dee Felton is a 42 y.o. female who presents for   Chief Complaint   Patient presents with    Rash     Face, x 3wks, itches and hyperpigmentation, tx hydrocortisone      Rash - Initial  Affected locations: face  Signs / symptoms: itching  Severity: mild  Timing: constant  Aggravated by: nothing  Relieving factors/Treatments tried: OTC hydrocortisone  Improvement on treatment: no relief    Review of Systems   Constitutional:  Negative for fever and chills.   HENT:  Negative for sore throat.    Respiratory:  Negative for cough.    Skin:  Positive for rash.      Objective:    Physical Exam   Constitutional: She appears well-developed and well-nourished.   Eyes: No conjunctival no injection.   Neurological: She is alert and oriented to person, place, and time.   Psychiatric: She has a normal mood and affect.   Skin:                    Diagram Legend     Erythematous scaling macule/papule c/w actinic keratosis       Vascular papule c/w angioma      Pigmented verrucoid papule/plaque c/w seborrheic keratosis      Yellow umbilicated papule c/w sebaceous hyperplasia      Irregularly shaped tan macule c/w lentigo     1-2 mm smooth white papules consistent with Milia      Movable subcutaneous cyst with punctum c/w epidermal inclusion cyst      Subcutaneous movable cyst c/w pilar cyst      Firm pink to brown papule c/w dermatofibroma      Pedunculated fleshy papule(s) c/w skin tag(s)      Evenly pigmented macule c/w junctional nevus     Mildly variegated pigmented, slightly irregular-bordered macule c/w mildly atypical nevus      Flesh colored to evenly pigmented papule c/w intradermal nevus       Pink pearly papule/plaque c/w basal cell carcinoma      Erythematous hyperkeratotic cursted plaque c/w SCC      Surgical scar with no sign of skin cancer recurrence      Open and closed comedones      Inflammatory papules and pustules      Verrucoid papule consistent consistent with wart      Erythematous eczematous patches and plaques     Dystrophic onycholytic nail with subungual debris c/w onychomycosis     Umbilicated papule    Erythematous-base heme-crusted tan verrucoid plaque consistent with inflamed seborrheic keratosis     Erythematous Silvery Scaling Plaque c/w Psoriasis     See annotation      Assessment / Plan:        Hyperpigmentation  -     hydroquinone 4 % Crea; Apply topically 2 (two) times daily. Patient to start 4% HQ carefully every other day for a week or so before attempting daily.  Later can go to twice daily, morning and night.  Dispense: 30 g; Refill: 1  Discussed with the patient the risk of color scars, erythema, or hyperpigmentation that could take months to resolve.  Reviewed with patient different treatment options and associated risks.  Proper application of medications and or care for affected area(s) and condition(s) reviewed.  Discussed with patient the etiology and pathogenesis of the disease or skin lesion(s) and possible treatments and aggravators.    Patient to start 4% HQ carefully every other day for a week or so before attempting daily.  Later can go to twice daily, morning and night.  Watch out for irritation, which can cause more darkening, which is not what we want.  If dryness occurs, take a break and apply coconut oil.  If irritation occurs, take a break and apply OTC hydrocortisone.    Facial rash  -     Ambulatory referral/consult to Dermatology  Previous [facility] labs and or records and notes reviewed and considered for their impact on our clinical decision making today.    Encounter for skin care  No hot water bathing reviewed.  Patient instructed in importance in daily sun protection. Sun avoidance and topical protection discussed.     Patient encouraged to wear hat for all outdoor exposure.     Also discussed sun protective clothing.  Recommend sun block with plain zinc oxide or sun block products with some zinc or titanium as their base with an spf of at  least 30 to be applied every 2-3 hours of outdoor exposure.    Rash of face  Patient and or guardian to monitor this area/lesion or these areas/lesions for changes or worsening or darkening (for moles and freckles).  Patient and or guardian to contact us if any changes are noted for such.  Pt to come in if recurs.  Suspect contact derm.           Follow up in about 4 months (around 6/16/2023).

## 2023-02-16 NOTE — PATIENT INSTRUCTIONS
Avoid using hot water     Patient instructed in importance in daily sun protection. Sun avoidance and topical protection discussed.     Patient encouraged to wear hat for all outdoor exposure.     Also discussed sun protective clothing.     Recommend sun block with plain zinc oxide or sun block products with some zinc or titanium as their base with an spf of at least 30 to be applied every 2-3 hours of outdoor exposure.

## 2023-03-06 ENCOUNTER — TELEPHONE (OUTPATIENT)
Dept: ALLERGY | Facility: CLINIC | Age: 43
End: 2023-03-06
Payer: COMMERCIAL

## 2023-03-06 ENCOUNTER — PATIENT MESSAGE (OUTPATIENT)
Dept: ALLERGY | Facility: CLINIC | Age: 43
End: 2023-03-06
Payer: COMMERCIAL

## 2023-03-06 DIAGNOSIS — R21 FACIAL RASH: ICD-10-CM

## 2023-03-06 DIAGNOSIS — R22.9 SUBCUTANEOUS NODULES: Primary | ICD-10-CM

## 2023-03-06 DIAGNOSIS — R76.8 POSITIVE ANA (ANTINUCLEAR ANTIBODY): ICD-10-CM

## 2023-03-07 NOTE — TELEPHONE ENCOUNTER
I have attempted to call patient several times over the past couple of weeks to relay her lab results. Phone went straight to voicemail every time. Will send her a portal message instead.    Villa Martell MD  Allergy/Immunology

## 2023-07-06 ENCOUNTER — OFFICE VISIT (OUTPATIENT)
Dept: URGENT CARE | Facility: CLINIC | Age: 43
End: 2023-07-06
Payer: COMMERCIAL

## 2023-07-06 VITALS
TEMPERATURE: 98 F | HEART RATE: 80 BPM | RESPIRATION RATE: 16 BRPM | HEIGHT: 67 IN | OXYGEN SATURATION: 99 % | BODY MASS INDEX: 41.59 KG/M2 | WEIGHT: 265 LBS | DIASTOLIC BLOOD PRESSURE: 80 MMHG | SYSTOLIC BLOOD PRESSURE: 176 MMHG

## 2023-07-06 DIAGNOSIS — R05.1 ACUTE COUGH: Primary | ICD-10-CM

## 2023-07-06 DIAGNOSIS — B96.89 BACTERIAL URI: ICD-10-CM

## 2023-07-06 DIAGNOSIS — J06.9 BACTERIAL URI: ICD-10-CM

## 2023-07-06 DIAGNOSIS — H10.32 ACUTE BACTERIAL CONJUNCTIVITIS OF LEFT EYE: ICD-10-CM

## 2023-07-06 LAB
CTP QC/QA: YES
SARS-COV-2 AG RESP QL IA.RAPID: NEGATIVE

## 2023-07-06 PROCEDURE — 99213 OFFICE O/P EST LOW 20 MIN: CPT | Mod: S$GLB,,, | Performed by: FAMILY MEDICINE

## 2023-07-06 PROCEDURE — 87811 SARS CORONAVIRUS 2 ANTIGEN POCT, MANUAL READ: ICD-10-PCS | Mod: QW,S$GLB,, | Performed by: FAMILY MEDICINE

## 2023-07-06 PROCEDURE — 99213 PR OFFICE/OUTPT VISIT, EST, LEVL III, 20-29 MIN: ICD-10-PCS | Mod: S$GLB,,, | Performed by: FAMILY MEDICINE

## 2023-07-06 PROCEDURE — 87811 SARS-COV-2 COVID19 W/OPTIC: CPT | Mod: QW,S$GLB,, | Performed by: FAMILY MEDICINE

## 2023-07-06 RX ORDER — SEMAGLUTIDE 1.34 MG/ML
INJECTION, SOLUTION SUBCUTANEOUS
COMMUNITY
Start: 2023-06-27

## 2023-07-06 RX ORDER — SEMAGLUTIDE 0.68 MG/ML
INJECTION, SOLUTION SUBCUTANEOUS
COMMUNITY
Start: 2023-04-12

## 2023-07-06 RX ORDER — IBUPROFEN 800 MG/1
800 TABLET ORAL 3 TIMES DAILY PRN
Qty: 21 TABLET | Refills: 0 | Status: SHIPPED | OUTPATIENT
Start: 2023-07-06 | End: 2023-07-13

## 2023-07-06 RX ORDER — POLYMYXIN B SULFATE AND TRIMETHOPRIM 1; 10000 MG/ML; [USP'U]/ML
1 SOLUTION OPHTHALMIC EVERY 6 HOURS
Qty: 10 ML | Refills: 0 | Status: SHIPPED | OUTPATIENT
Start: 2023-07-06 | End: 2023-07-13

## 2023-07-06 RX ORDER — PROMETHAZINE HYDROCHLORIDE AND DEXTROMETHORPHAN HYDROBROMIDE 6.25; 15 MG/5ML; MG/5ML
5 SYRUP ORAL EVERY 4 HOURS PRN
Qty: 240 ML | Refills: 0 | Status: SHIPPED | OUTPATIENT
Start: 2023-07-06 | End: 2023-07-16

## 2023-07-06 RX ORDER — DAPAGLIFLOZIN 10 MG/1
10 TABLET, FILM COATED ORAL
COMMUNITY
Start: 2023-02-23 | End: 2024-03-16

## 2023-07-06 RX ORDER — AZITHROMYCIN 250 MG/1
TABLET, FILM COATED ORAL
Qty: 6 TABLET | Refills: 0 | Status: SHIPPED | OUTPATIENT
Start: 2023-07-06 | End: 2023-07-11

## 2023-07-06 RX ORDER — LORATADINE 10 MG/1
10 TABLET ORAL
COMMUNITY
Start: 2023-02-28

## 2023-07-06 RX ORDER — PEN NEEDLE, DIABETIC 32GX 5/32"
NEEDLE, DISPOSABLE MISCELLANEOUS
COMMUNITY
Start: 2023-02-28

## 2023-07-06 NOTE — PROGRESS NOTES
"Subjective:      Patient ID: Dee Felton is a 42 y.o. female.    Vitals:  height is 5' 7" (1.702 m) and weight is 120.2 kg (264 lb 15.9 oz). Her oral temperature is 97.7 °F (36.5 °C). Her blood pressure is 176/80 (abnormal) and her pulse is 80. Her respiration is 16 and oxygen saturation is 99%.     Chief Complaint: Sore Throat, Eye Problem, and Otalgia    Patient reports to the clinic with the compliant of a sore throat that presented about a week ago; as well as bilateral ear pain and left eye irritation that presented this week; she reports taking benadryl, mucinex, loratadine,and otc commercial eye drops with minimal relief. States symptoms are gradually worsening.     Sore Throat   This is a new problem. The current episode started in the past 7 days. The problem has been gradually worsening. Neither side of throat is experiencing more pain than the other. There has been no fever. The pain is at a severity of 5/10. The pain is mild. Associated symptoms include congestion, coughing, ear pain, headaches, neck pain, swollen glands and trouble swallowing. Pertinent negatives include no abdominal pain, diarrhea, drooling, ear discharge, hoarse voice, plugged ear sensation, shortness of breath, stridor or vomiting. She has had no exposure to strep or mono. She has tried acetaminophen, oral narcotic analgesics and NSAIDs for the symptoms. The treatment provided no relief.   Eye Problem   Pertinent negatives include no vomiting.   Otalgia   There is pain in both ears. This is a new problem. The current episode started in the past 7 days. The problem occurs constantly. The problem has been gradually worsening. There has been no fever. The pain is at a severity of 5/10. The pain is mild. Associated symptoms include coughing, headaches, neck pain and a sore throat. Pertinent negatives include no abdominal pain, diarrhea, ear discharge or vomiting. She has tried ear drops for the symptoms. The treatment provided " "no relief. There is no history of a chronic ear infection, hearing loss or a tympanostomy tube.     HENT:  Positive for ear pain, congestion, sore throat and trouble swallowing. Negative for ear discharge and drooling.    Neck: Positive for neck pain.   Respiratory:  Positive for cough. Negative for shortness of breath and stridor.    Gastrointestinal:  Negative for abdominal pain, vomiting and diarrhea.   Neurological:  Positive for headaches.    Objective:     Vitals:    07/06/23 1239 07/06/23 1302   BP: (!) 180/98 (!) 176/80   Pulse: 80 80   Resp: 16    Temp: 97.7 °F (36.5 °C)    TempSrc: Oral    SpO2: 99%    Weight: 120.2 kg (264 lb 15.9 oz)    Height: 5' 7" (1.702 m)       Physical Exam   Constitutional: She is oriented to person, place, and time. She appears well-developed. She is cooperative.  Non-toxic appearance. She does not appear ill. No distress.   HENT:   Head: Normocephalic and atraumatic.   Ears:   Right Ear: Hearing, tympanic membrane, external ear and ear canal normal.   Left Ear: Hearing, tympanic membrane, external ear and ear canal normal.   Nose: Congestion present. No mucosal edema, rhinorrhea or nasal deformity. No epistaxis. Right sinus exhibits no maxillary sinus tenderness and no frontal sinus tenderness. Left sinus exhibits no maxillary sinus tenderness and no frontal sinus tenderness.   Mouth/Throat: Uvula is midline, oropharynx is clear and moist and mucous membranes are normal. No trismus in the jaw. Normal dentition. No uvula swelling. No oropharyngeal exudate, posterior oropharyngeal edema or posterior oropharyngeal erythema.   Eyes: Conjunctivae and lids are normal. Left eye exhibits discharge (left conjunctival redness). No scleral icterus.   Neck: Trachea normal and phonation normal. Neck supple. No edema present. No erythema present. No neck rigidity present.   Cardiovascular: Normal rate, regular rhythm, normal heart sounds and normal pulses.   Pulmonary/Chest: Effort normal and " breath sounds normal. No respiratory distress. She has no decreased breath sounds. She has no rhonchi.   Abdominal: Normal appearance.   Musculoskeletal: Normal range of motion.         General: Normal range of motion.   Neurological: She is alert and oriented to person, place, and time. She exhibits normal muscle tone. Coordination normal.   Skin: Skin is warm, intact and not diaphoretic.   Psychiatric: Her speech is normal and behavior is normal. Judgment and thought content normal.   Nursing note and vitals reviewed.    Results for orders placed or performed in visit on 07/06/23   SARS Coronavirus 2 Antigen, POCT Manual Read   Result Value Ref Range    SARS Coronavirus 2 Antigen Negative Negative     Acceptable Yes       Assessment:     1. Acute cough    2. Bacterial URI    3. Acute bacterial conjunctivitis of left eye        Plan:       Acute cough  -     SARS Coronavirus 2 Antigen, POCT Manual Read  -     promethazine-dextromethorphan (PROMETHAZINE-DM) 6.25-15 mg/5 mL Syrp; Take 5 mLs by mouth every 4 (four) hours as needed (cough).  Dispense: 240 mL; Refill: 0    2. Bacterial URI  -     azithromycin (Z-RYLIE) 250 MG tablet; Take 2 tablets by mouth on day 1; Take 1 tablet by mouth on days 2-5  Dispense: 6 tablet; Refill: 0  -     ibuprofen (ADVIL,MOTRIN) 800 MG tablet; Take 1 tablet (800 mg total) by mouth 3 (three) times daily as needed for Pain. Take with meals  Dispense: 21 tablet; Refill: 0    3. Acute bacterial conjunctivitis of left eye  -     polymyxin B sulf-trimethoprim (POLYTRIM) 10,000 unit- 1 mg/mL Drop; Place 1 drop into the left eye every 6 (six) hours. May substitute with ofloxacin 0.3% opht 1 drop q 6 hrly to left eye for 7 days  Dispense: 10 mL; Refill: 0      Patient Instructions   Below are suggestions for symptomatic relief of your upper respiratory symptoms:              -Salt water gargles to soothe throat pain.              -Chloroseptic spray and Cepacol lozenges also help  to numb throat pain.              -Warm herbal teas with honey/lemon/jazmin can help soothe sore throat and hoarseness              -Nasal saline spray reduces inflammation and dryness.              -Warm face compresses to help with facial sinus pain/pressure.              -Humidifiers and steam can help with nasal dryness and congestion              -Vicks vapor rub at night for chest congestion.              -Flonase OTC or Nasacort OTC for nasal congestion and post-nasal drip. Ok to use twice daily for the first week, then reduce to once daily after symptoms have begun to improve.              -Afrin is a nasal spray that can give immediate relief of nasal congestion but you cannot use this medication for more than 3 days              -Simple foods like chicken noodle soup.              - Mucinex for congestion or Mucinex DM for cough during the day time. Delsym helps with coughing at night. Mucinex-D if you have sinus pressure/sinus pain or chest congestion. (caution if history of high blood pressure or palpitations). You must increase your water intake when using expectorants (Mucinex).             -Zyrtec/Claritin/Allegra/Xyzal should help with allergies.  -If you DO NOT have Hypertension or any history of palpitations, it is ok to take over the counter Sudafed or Mucinex D or Allegra-D or Claritin-D or Zyrtec-D.  -If you do take one of the above, it is ok to combine that with plain over the counter Mucinex or Allegra or Claritin or Zyrtec. If, for example, you are taking Zyrtec -D, you can combine that with Mucinex, but not Mucinex-D.  If you are taking Mucinex-D, you can combine that with plain Allegra or Claritin or Zyrtec.   -If you DO have Hypertension or palpitations, it is safe to take Coricidin HBP for relief of sinus symptoms.

## 2023-07-06 NOTE — PATIENT INSTRUCTIONS
Below are suggestions for symptomatic relief of your upper respiratory symptoms:              -Salt water gargles to soothe throat pain.              -Chloroseptic spray and Cepacol lozenges also help to numb throat pain.              -Warm herbal teas with honey/lemon/jazmin can help soothe sore throat and hoarseness              -Nasal saline spray reduces inflammation and dryness.              -Warm face compresses to help with facial sinus pain/pressure.              -Humidifiers and steam can help with nasal dryness and congestion              -Vicks vapor rub at night for chest congestion.              -Flonase OTC or Nasacort OTC for nasal congestion and post-nasal drip. Ok to use twice daily for the first week, then reduce to once daily after symptoms have begun to improve.              -Afrin is a nasal spray that can give immediate relief of nasal congestion but you cannot use this medication for more than 3 days              -Simple foods like chicken noodle soup.              - Mucinex for congestion or Mucinex DM for cough during the day time. Delsym helps with coughing at night. Mucinex-D if you have sinus pressure/sinus pain or chest congestion. (caution if history of high blood pressure or palpitations). You must increase your water intake when using expectorants (Mucinex).             -Zyrtec/Claritin/Allegra/Xyzal should help with allergies.  -If you DO NOT have Hypertension or any history of palpitations, it is ok to take over the counter Sudafed or Mucinex D or Allegra-D or Claritin-D or Zyrtec-D.  -If you do take one of the above, it is ok to combine that with plain over the counter Mucinex or Allegra or Claritin or Zyrtec. If, for example, you are taking Zyrtec -D, you can combine that with Mucinex, but not Mucinex-D.  If you are taking Mucinex-D, you can combine that with plain Allegra or Claritin or Zyrtec.   -If you DO have Hypertension or palpitations, it is safe to take Coricidin HBP for  relief of sinus symptoms.

## 2023-08-15 ENCOUNTER — OFFICE VISIT (OUTPATIENT)
Dept: URGENT CARE | Facility: CLINIC | Age: 43
End: 2023-08-15
Payer: COMMERCIAL

## 2023-08-15 VITALS
HEIGHT: 67 IN | TEMPERATURE: 98 F | WEIGHT: 265 LBS | HEART RATE: 90 BPM | RESPIRATION RATE: 16 BRPM | SYSTOLIC BLOOD PRESSURE: 155 MMHG | OXYGEN SATURATION: 99 % | BODY MASS INDEX: 41.59 KG/M2 | DIASTOLIC BLOOD PRESSURE: 93 MMHG

## 2023-08-15 DIAGNOSIS — J02.9 VIRAL PHARYNGITIS: Primary | ICD-10-CM

## 2023-08-15 DIAGNOSIS — J02.9 SORE THROAT: ICD-10-CM

## 2023-08-15 LAB
CTP QC/QA: YES
CTP QC/QA: YES
MOLECULAR STREP A: NEGATIVE
SARS-COV-2 AG RESP QL IA.RAPID: NEGATIVE

## 2023-08-15 PROCEDURE — 87811 SARS CORONAVIRUS 2 ANTIGEN POCT, MANUAL READ: ICD-10-PCS | Mod: QW,S$GLB,, | Performed by: NURSE PRACTITIONER

## 2023-08-15 PROCEDURE — 87651 STREP A DNA AMP PROBE: CPT | Mod: QW,S$GLB,, | Performed by: NURSE PRACTITIONER

## 2023-08-15 PROCEDURE — 87811 SARS-COV-2 COVID19 W/OPTIC: CPT | Mod: QW,S$GLB,, | Performed by: NURSE PRACTITIONER

## 2023-08-15 PROCEDURE — 87651 POCT STREP A MOLECULAR: ICD-10-PCS | Mod: QW,S$GLB,, | Performed by: NURSE PRACTITIONER

## 2023-08-15 PROCEDURE — 99214 OFFICE O/P EST MOD 30 MIN: CPT | Mod: S$GLB,,, | Performed by: NURSE PRACTITIONER

## 2023-08-15 PROCEDURE — 99214 PR OFFICE/OUTPT VISIT, EST, LEVL IV, 30-39 MIN: ICD-10-PCS | Mod: S$GLB,,, | Performed by: NURSE PRACTITIONER

## 2023-08-15 RX ORDER — AZITHROMYCIN 250 MG/1
TABLET, FILM COATED ORAL
COMMUNITY
Start: 2023-07-24 | End: 2024-03-16

## 2023-08-15 RX ORDER — HYDROXYZINE PAMOATE 25 MG/1
25 CAPSULE ORAL NIGHTLY PRN
COMMUNITY
Start: 2023-07-24

## 2023-08-15 RX ORDER — OFLOXACIN 3 MG/ML
1 SOLUTION/ DROPS OPHTHALMIC EVERY 6 HOURS
COMMUNITY
Start: 2023-07-06

## 2023-08-15 NOTE — PROGRESS NOTES
"Subjective:      Patient ID: Dee Felton is a 42 y.o. female.    Vitals:  height is 5' 7" (1.702 m) and weight is 120.2 kg (264 lb 15.9 oz). Her oral temperature is 98.3 °F (36.8 °C). Her blood pressure is 155/93 (abnormal) and her pulse is 90. Her respiration is 16 and oxygen saturation is 99%.     Chief Complaint: Sinus Problem    Pt is a  41 yo female w/ c/o diarrhea on Sunday. Pt reports that she has been having a sore throat, neck pain, body aches. Pt reports that she has been having painful swallowing. Pt reports that she vomited around 5 times. She has been exposed to COVID at work. 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 She is vaccinated for covid.      Sinus Problem  This is a new problem. The current episode started in the past 7 days (sunday). The problem is unchanged. There has been no fever. Her pain is at a severity of 9/10. The pain is moderate. Associated symptoms include ear pain, headaches and a sore throat. Pertinent negatives include no chills, congestion, coughing, diaphoresis, hoarse voice, neck pain, shortness of breath, sinus pressure, sneezing or swollen glands. Past treatments include oral decongestants. The treatment provided mild relief.       Constitution: Positive for fatigue. Negative for chills and sweating.   HENT:  Positive for ear pain and sore throat. Negative for congestion and sinus pressure.    Neck: Negative for neck pain.   Respiratory:  Negative for cough and shortness of breath.    Gastrointestinal:  Positive for vomiting and diarrhea.   Allergic/Immunologic: Negative for sneezing.   Neurological:  Positive for headaches.      Objective:     Physical Exam   Constitutional: She is oriented to person, place, and time. She appears well-developed. She is cooperative.  Non-toxic appearance. She does not appear ill. No distress.   HENT:   Head: Normocephalic and atraumatic.   Ears:   Right Ear: Hearing, tympanic membrane, external ear and ear canal normal.   Left Ear: Hearing, " tympanic membrane, external ear and ear canal normal.   Nose: Nose normal. No mucosal edema, rhinorrhea or nasal deformity. No epistaxis. Right sinus exhibits no maxillary sinus tenderness and no frontal sinus tenderness. Left sinus exhibits no maxillary sinus tenderness and no frontal sinus tenderness.   Mouth/Throat: Uvula is midline and mucous membranes are normal. No trismus in the jaw. Normal dentition. No uvula swelling. Posterior oropharyngeal erythema present. No oropharyngeal exudate or posterior oropharyngeal edema. Tonsils are 1+ on the right. Tonsils are 1+ on the left.   Eyes: Conjunctivae and lids are normal. No scleral icterus.   Neck: Trachea normal and phonation normal. Neck supple. No edema present. No erythema present. No neck rigidity present.   Cardiovascular: Normal rate, regular rhythm, normal heart sounds and normal pulses.   Pulmonary/Chest: Effort normal and breath sounds normal. No respiratory distress. She has no decreased breath sounds. She has no rhonchi.   Abdominal: Normal appearance.   Musculoskeletal: Normal range of motion.         General: No deformity. Normal range of motion.   Neurological: She is alert and oriented to person, place, and time. She exhibits normal muscle tone. Coordination normal.   Skin: Skin is warm, dry, intact, not diaphoretic and not pale.   Psychiatric: Her speech is normal and behavior is normal. Judgment and thought content normal.   Nursing note and vitals reviewed.    Results for orders placed or performed in visit on 08/15/23   SARS Coronavirus 2 Antigen, POCT Manual Read   Result Value Ref Range    SARS Coronavirus 2 Antigen Negative Negative     Acceptable Yes    POCT Strep A, Molecular   Result Value Ref Range    Molecular Strep A, POC Negative Negative     Acceptable Yes        Assessment:     1. Viral pharyngitis    2. Sore throat        Plan:       Viral pharyngitis    Sore throat  -     SARS Coronavirus 2 Antigen,  POCT Manual Read  -     POCT Strep A, Molecular  -     (Magic mouthwash) 1:1:1 diphenhydrAMINE(Benadryl) 12.5mg/5ml liq, aluminum & magnesium hydroxide-simethicone (Maalox), LIDOcaine viscous 2%; Swish and spit 10 mLs every 4 (four) hours as needed (sore throat).  Dispense: 180 mL; Refill: 0      Patient Instructions   - You must understand that you have received an Urgent Care treatment only and that you may be released before all of your medical problems are known or treated.   - You, the patient, will arrange for follow up care as instructed.   - If your condition worsens or fails to improve we recommend that you receive another evaluation at the ER immediately or contact your PCP to discuss your concerns.   - You can call [phone] or [phone] to help schedule an appointment with the appropriate provider.    Drink plenty of fluids   Get lots of rest  Tylenol or ibuprofen for pain/fever  Mucinex DM for cough  Saline nasal rinses to irrigate sinus cavities  Warm salt water gargles for sore throat  Warm tea with honey and lemon for sore throat  You are contagious and should stay home while sick. After 24 hours fever-free, you may return to work/school (without fever reducing medications) and otherwise feel well.   Use magic mouthwash as needed for sore throat. Swish, gargle, and spit. Do not swallow

## 2023-08-15 NOTE — LETTER
August 15, 2023      Urgent [facility] [address] [address]  Phone: [phone]  Fax: [phone]       Patient: Dee Felton   YOB: 1980  Date of Visit: 08/15/2023    To Whom It May Concern:    Orquidea Felton  was at [facility] on 08/15/2023. The patient may return to work/school on 08/17/2023 with no restrictions. If you have any questions or concerns, or if I can be of further assistance, please do not hesitate to contact me.    Sincerely,    Mitra Billingsley NP

## 2023-08-15 NOTE — PATIENT INSTRUCTIONS
- You must understand that you have received an Urgent Care treatment only and that you may be released before all of your medical problems are known or treated.   - You, the patient, will arrange for follow up care as instructed.   - If your condition worsens or fails to improve we recommend that you receive another evaluation at the ER immediately or contact your PCP to discuss your concerns.   - You can call [phone] or [phone] to help schedule an appointment with the appropriate provider.    Drink plenty of fluids   Get lots of rest  Tylenol or ibuprofen for pain/fever  Mucinex DM for cough  Saline nasal rinses to irrigate sinus cavities  Warm salt water gargles for sore throat  Warm tea with honey and lemon for sore throat  You are contagious and should stay home while sick. After 24 hours fever-free, you may return to work/school (without fever reducing medications) and otherwise feel well.   Use magic mouthwash as needed for sore throat. Swish, gargle, and spit. Do not swallow

## 2023-08-16 ENCOUNTER — TELEPHONE (OUTPATIENT)
Dept: URGENT CARE | Facility: CLINIC | Age: 43
End: 2023-08-16
Payer: COMMERCIAL

## 2024-03-16 ENCOUNTER — OFFICE VISIT (OUTPATIENT)
Dept: URGENT CARE | Facility: CLINIC | Age: 44
End: 2024-03-16
Payer: COMMERCIAL

## 2024-03-16 VITALS
WEIGHT: 265 LBS | OXYGEN SATURATION: 98 % | HEART RATE: 90 BPM | RESPIRATION RATE: 16 BRPM | HEIGHT: 67 IN | DIASTOLIC BLOOD PRESSURE: 97 MMHG | SYSTOLIC BLOOD PRESSURE: 146 MMHG | TEMPERATURE: 97 F | BODY MASS INDEX: 41.59 KG/M2

## 2024-03-16 DIAGNOSIS — E11.9 TYPE 2 DIABETES MELLITUS WITHOUT COMPLICATION, WITH LONG-TERM CURRENT USE OF INSULIN: ICD-10-CM

## 2024-03-16 DIAGNOSIS — B96.89 BACTERIAL SINUSITIS: Primary | ICD-10-CM

## 2024-03-16 DIAGNOSIS — J32.9 BACTERIAL SINUSITIS: Primary | ICD-10-CM

## 2024-03-16 DIAGNOSIS — R35.0 URINARY FREQUENCY: ICD-10-CM

## 2024-03-16 DIAGNOSIS — Z79.4 TYPE 2 DIABETES MELLITUS WITHOUT COMPLICATION, WITH LONG-TERM CURRENT USE OF INSULIN: ICD-10-CM

## 2024-03-16 DIAGNOSIS — R09.81 CONGESTION OF NASAL SINUS: ICD-10-CM

## 2024-03-16 LAB
BILIRUB UR QL STRIP: NEGATIVE
CTP QC/QA: YES
GLUCOSE UR QL STRIP: POSITIVE
KETONES UR QL STRIP: NEGATIVE
LEUKOCYTE ESTERASE UR QL STRIP: NEGATIVE
PH, POC UA: 5 (ref 5–8)
POC BLOOD, URINE: NEGATIVE
POC NITRATES, URINE: NEGATIVE
PROT UR QL STRIP: NEGATIVE
SARS-COV-2 AG RESP QL IA.RAPID: NEGATIVE
SP GR UR STRIP: 1.02 (ref 1–1.03)
UROBILINOGEN UR STRIP-ACNC: ABNORMAL (ref 0.1–1.1)

## 2024-03-16 PROCEDURE — 99214 OFFICE O/P EST MOD 30 MIN: CPT | Mod: S$GLB,,, | Performed by: NURSE PRACTITIONER

## 2024-03-16 PROCEDURE — 81003 URINALYSIS AUTO W/O SCOPE: CPT | Mod: QW,S$GLB,, | Performed by: NURSE PRACTITIONER

## 2024-03-16 PROCEDURE — 87086 URINE CULTURE/COLONY COUNT: CPT | Performed by: NURSE PRACTITIONER

## 2024-03-16 PROCEDURE — 87811 SARS-COV-2 COVID19 W/OPTIC: CPT | Mod: QW,S$GLB,, | Performed by: NURSE PRACTITIONER

## 2024-03-16 RX ORDER — AZELASTINE 1 MG/ML
1 SPRAY, METERED NASAL 2 TIMES DAILY
Qty: 30 ML | Refills: 0 | Status: SHIPPED | OUTPATIENT
Start: 2024-03-16 | End: 2025-03-16

## 2024-03-16 RX ORDER — DOXYCYCLINE 100 MG/1
100 CAPSULE ORAL 2 TIMES DAILY
Qty: 14 CAPSULE | Refills: 0 | Status: SHIPPED | OUTPATIENT
Start: 2024-03-16 | End: 2024-03-23

## 2024-03-16 NOTE — PROGRESS NOTES
"Subjective:      Patient ID: Dee Felton is a 43 y.o. female.    Vitals:  height is 5' 7" (1.702 m) and weight is 120.2 kg (264 lb 15.9 oz). Her oral temperature is 97 °F (36.1 °C). Her blood pressure is 146/97 (abnormal) and her pulse is 90. Her respiration is 16 and oxygen saturation is 98%.     Chief Complaint: Sinus Problem (With bloody mucus associated. )    43-year-old female presents with a complaint of bloody mucus nasal discharge, sinus pressure, sinus pain, sneezing, and urinary frequency.  She reports symptoms started, 1 week ago.  She denies dysuria, flank pain, fever, chills or history of renal calculi or recurrent UTI.  She denies vaginal complaints, such as vaginal odor, vaginal discharge or pelvic pain.     Sinus Problem  This is a new problem. The current episode started in the past 7 days. The problem has been waxing and waning since onset. There has been no fever. Her pain is at a severity of 4/10. The pain is mild. Associated symptoms include congestion, sinus pressure and sneezing. Pertinent negatives include no chills, coughing, diaphoresis, ear pain, headaches, hoarse voice, neck pain, shortness of breath, sore throat or swollen glands. Treatments tried: mucinex. The treatment provided no relief.       Constitution: Negative for chills, sweating, fever and generalized weakness.   HENT:  Positive for congestion, nosebleeds, postnasal drip, sinus pain and sinus pressure. Negative for ear pain, foreign body in nose, sore throat, trouble swallowing and voice change.    Neck: Negative for neck pain.   Cardiovascular:  Negative for chest pain and palpitations.   Respiratory:  Negative for chest tightness, cough and shortness of breath.    Gastrointestinal:  Negative for abdominal pain, nausea and vomiting.   Genitourinary:  Positive for frequency. Negative for dysuria, urgency, urine decreased, flank pain, hematuria, history of kidney stones, vaginal pain, vaginal discharge, vaginal " bleeding, vaginal odor, genital sore and pelvic pain.   Skin:  Negative for rash.   Allergic/Immunologic: Positive for sneezing.   Neurological:  Negative for headaches.      Objective:     Physical Exam   Constitutional: She is oriented to person, place, and time. She appears well-developed. She is cooperative.  Non-toxic appearance. She does not appear ill. No distress.   HENT:   Head: Normocephalic and atraumatic.   Ears:   Right Ear: Hearing, tympanic membrane, external ear and ear canal normal. impacted cerumen  Left Ear: Hearing, tympanic membrane, external ear and ear canal normal. impacted cerumen  Nose: Mucosal edema, rhinorrhea, sinus tenderness and congestion present. No nasal deformity. No epistaxis. Right sinus exhibits maxillary sinus tenderness. Right sinus exhibits no frontal sinus tenderness. Left sinus exhibits maxillary sinus tenderness. Left sinus exhibits no frontal sinus tenderness.   Mouth/Throat: Uvula is midline, oropharynx is clear and moist and mucous membranes are normal. No trismus in the jaw. Normal dentition. No uvula swelling. No oropharyngeal exudate, posterior oropharyngeal edema or posterior oropharyngeal erythema.   Eyes: Conjunctivae and lids are normal. No scleral icterus.   Neck: Trachea normal and phonation normal. Neck supple. No edema present. No erythema present. No neck rigidity present.   Cardiovascular: Normal rate, regular rhythm, normal heart sounds and normal pulses.   Pulmonary/Chest: Effort normal and breath sounds normal. No respiratory distress. She has no decreased breath sounds. She has no rhonchi.   Abdominal: Normal appearance and bowel sounds are normal. She exhibits no distension and no mass. There is no abdominal tenderness. There is no rebound, no guarding, no left CVA tenderness and no right CVA tenderness. No hernia.   Musculoskeletal: Normal range of motion.         General: No deformity. Normal range of motion.   Neurological: She is alert and oriented  to person, place, and time. She exhibits normal muscle tone. Coordination normal.   Skin: Skin is warm, dry, intact, not diaphoretic and not pale.   Psychiatric: Her speech is normal and behavior is normal. Judgment and thought content normal.   Nursing note and vitals reviewed.      Assessment:     1. Bacterial sinusitis    2. Congestion of nasal sinus    3. Urinary frequency    4. Type 2 diabetes mellitus without complication, with long-term current use of insulin      Results for orders placed or performed in visit on 03/16/24   SARS Coronavirus 2 Antigen, POCT Manual Read   Result Value Ref Range    SARS Coronavirus 2 Antigen Negative Negative     Acceptable Yes    POCT Urinalysis, Dipstick, Automated, W/O Scope   Result Value Ref Range    POC Blood, Urine Negative Negative    POC Bilirubin, Urine Negative Negative    POC Urobilinogen, Urine NORM 0.1 - 1.1    POC Ketones, Urine Negative Negative    POC Protein, Urine Negative Negative    POC Nitrates, Urine Negative Negative    POC Glucose, Urine Positive (A) Negative    pH, UA 5.0 5 - 8    POC Specific Gravity, Urine 1.025 1.003 - 1.029    POC Leukocytes, Urine Negative Negative        Plan:     Bacterial sinusitis  -     doxycycline (VIBRAMYCIN) 100 MG Cap; Take 1 capsule (100 mg total) by mouth 2 (two) times daily. for 7 days  Dispense: 14 capsule; Refill: 0  -     azelastine (ASTELIN) 137 mcg (0.1 %) nasal spray; 1 spray (137 mcg total) by Nasal route 2 (two) times daily.  Dispense: 30 mL; Refill: 0    Congestion of nasal sinus  -     SARS Coronavirus 2 Antigen, POCT Manual Read  -     POCT Urinalysis, Dipstick, Automated, W/O Scope    Urinary frequency  -     CULTURE, URINE    Type 2 diabetes mellitus without complication, with long-term current use of insulin      ou can try breathe right strips at night to help you breathe.  A cool mist humidifier in bedroom may help with cough and relieve stuffy nose.     Sore throat:  Lozenge, hard candy  or honey.      Sinus rinses DO NOT USE TAP WATER, if you must, water must be a rolling boil for 1 minute, let it cool, then use.  May use distilled water, or over the counter nasal saline rinses.  Vics vapor rub in shower to help open nasal passages.  May use nasal gel to keep passages moisturized.  May use Nasal saline sprays during the day for added relief of congestion.   For those who go to the gym, please do not use the sauna or steam room now to clear sinuses.    During pollen season, change shirt if you are outside for a while when you go in.  Also wash your face.  Do not touch your face with your hands.  Wash your hands often in general while ill, avoid face contact with hands.     Over the counter you can use Tylenol (acetominophen) or Ibuprofen for your minor aches and pains as long as you have no contraindications.    Good nutrition. Lots of rest. Plenty of fluids.    Urinary frequency instructions:    Drink plenty of fluids, wipe front to back, take showers not baths, no scented soaps, wear breathable cotton underwear, urinate after sexual intercourse.     A urine culture was sent. You will be contacted once it results and appropriate action will be taken if needed.       Please go to the ER for worsening symptoms including fever, worsening flank pain, vomiting, etc.       Please return or see your primary care doctor if you develop new or worsening symptoms.     Please arrange follow up with your primary medical clinic as soon as possible. You must understand that you've received an Urgent Care treatment only and that you may be released before all of your medical problems are known or treated. You, the patient, will arrange for follow up as instructed. If your symptoms worsen or fail to improve you should go to the Emergency Room.  WE CANNOT RULE OUT ALL POSSIBLE CAUSES OF YOUR SYMPTOMS IN THE URGENT CARE SETTING PLEASE GO TO THE ER IF YOU FEELS YOUR CONDITION IS WORSENING OR YOU WOULD LIKE EMERGENT  EVALUATION.       You must understand that you've received an Urgent Care treatment only and that you may be released before all your medical problems are known or treated. You, the patient, will arrange for follow up care as instructed.  Follow up with your PCP or specialty clinic as directed in the next 1-2 weeks if not improved or as needed.  You can call [phone] to schedule an appointment with the appropriate provider.  If your condition worsens we recommend that you receive another evaluation at the emergency room immediately or contact your primary medical clinics after hours call service to discuss your concerns.  Please return here or go to the Emergency Department for any concerns or worsening of condition.    If you were prescribed a narcotic or controlled medication, do not drive or operate heavy equipment or machinery while taking these medications.    Thank you for choosing [facility]!    Our goal in the Urgent Care is to always provide outstanding medical care. You may receive a survey by mail or e-mail in the next week regarding your experience today. We would greatly appreciate you completing and returning the survey. Your feedback provides us with a way to recognize our staff who provide very good care, and it helps us learn how to improve when your experience was below our aspiration of excellence.      We appreciate you trusting us with your medical care. We hope you feel better soon. We will be happy to take care of you for all of your future medical needs.   This note was prepared using voice-recognition software.  Although efforts are made to proofread the note, some errors may persist in the final document.     Sincerely,    Jeremie Saul DNP, ADC

## 2024-03-16 NOTE — PATIENT INSTRUCTIONS
You can try breathe right strips at night to help you breathe.  A cool mist humidifier in bedroom may help with cough and relieve stuffy nose.     Sore throat:  Lozenge, hard candy or honey.      Sinus rinses DO NOT USE TAP WATER, if you must, water must be a rolling boil for 1 minute, let it cool, then use.  May use distilled water, or over the counter nasal saline rinses.  Vics vapor rub in shower to help open nasal passages.  May use nasal gel to keep passages moisturized.  May use Nasal saline sprays during the day for added relief of congestion.   For those who go to the gym, please do not use the sauna or steam room now to clear sinuses.    During pollen season, change shirt if you are outside for a while when you go in.  Also wash your face.  Do not touch your face with your hands.  Wash your hands often in general while ill, avoid face contact with hands.     Over the counter you can use Tylenol (acetominophen) or Ibuprofen for your minor aches and pains as long as you have no contraindications.    Good nutrition. Lots of rest. Plenty of fluids.    Urinary frequency instructions:    Drink plenty of fluids, wipe front to back, take showers not baths, no scented soaps, wear breathable cotton underwear, urinate after sexual intercourse.     A urine culture was sent. You will be contacted once it results and appropriate action will be taken if needed.       Please go to the ER for worsening symptoms including fever, worsening flank pain, vomiting, etc.       Please return or see your primary care doctor if you develop new or worsening symptoms.     Please arrange follow up with your primary medical clinic as soon as possible. You must understand that you've received an Urgent Care treatment only and that you may be released before all of your medical problems are known or treated. You, the patient, will arrange for follow up as instructed. If your symptoms worsen or fail to improve you should go to the Emergency  Room.  WE CANNOT RULE OUT ALL POSSIBLE CAUSES OF YOUR SYMPTOMS IN THE URGENT CARE SETTING PLEASE GO TO THE ER IF YOU FEELS YOUR CONDITION IS WORSENING OR YOU WOULD LIKE EMERGENT EVALUATION.       You must understand that you've received an Urgent Care treatment only and that you may be released before all your medical problems are known or treated. You, the patient, will arrange for follow up care as instructed.  Follow up with your PCP or specialty clinic as directed in the next 1-2 weeks if not improved or as needed.  You can call [phone] to schedule an appointment with the appropriate provider.  If your condition worsens we recommend that you receive another evaluation at the emergency room immediately or contact your primary medical clinics after hours call service to discuss your concerns.  Please return here or go to the Emergency Department for any concerns or worsening of condition.    If you were prescribed a narcotic or controlled medication, do not drive or operate heavy equipment or machinery while taking these medications.    Thank you for choosing [facility]!    Our goal in the Urgent Care is to always provide outstanding medical care. You may receive a survey by mail or e-mail in the next week regarding your experience today. We would greatly appreciate you completing and returning the survey. Your feedback provides us with a way to recognize our staff who provide very good care, and it helps us learn how to improve when your experience was below our aspiration of excellence.      We appreciate you trusting us with your medical care. We hope you feel better soon. We will be happy to take care of you for all of your future medical needs.   This note was prepared using voice-recognition software.  Although efforts are made to proofread the note, some errors may persist in the final document.     Sincerely,    Jeremie Saul DNP, FNP-C

## 2024-03-18 LAB — BACTERIA UR CULT: NO GROWTH

## 2024-03-20 ENCOUNTER — TELEPHONE (OUTPATIENT)
Dept: URGENT CARE | Facility: CLINIC | Age: 44
End: 2024-03-20
Payer: COMMERCIAL

## 2024-03-20 ENCOUNTER — PATIENT MESSAGE (OUTPATIENT)
Dept: URGENT CARE | Facility: CLINIC | Age: 44
End: 2024-03-20
Payer: COMMERCIAL

## 2024-03-20 NOTE — TELEPHONE ENCOUNTER
Tried to call patient was unable to leave voice mail.will send note to [facility].        ----- Message from Mir Miarmontes PA-C sent at 3/20/2024  8:36 AM CDT -----  Regarding: neg urine cx  Please notify patient of negative urine culture results from previous visit.    Results for orders placed or performed in visit on 03/16/24  -CULTURE, URINE:   Specimen: Urine       Result                      Value             Ref Range           Urine Culture, Routine      No growth                        -SARS Coronavirus 2 Antigen, POCT Manual Read:        Result                      Value             Ref Range           SARS Coronavirus 2 Ant#     Negative          Negative             Accept#     Yes                              -POCT Urinalysis, Dipstick, Automated, W/O Scope:        Result                      Value             Ref Range           POC Blood, Urine            Negative          Negative            POC Bilirubin, Urine        Negative          Negative            POC Urobilinogen, Urine     NORM              0.1 - 1.1           POC Ketones, Urine          Negative          Negative            POC Protein, Urine          Negative          Negative            POC Nitrates, Urine         Negative          Negative            POC Glucose, Urine          Positive (A)      Negative            pH, UA                      5.0               5 - 8               POC Specific Gravity, #     1.025             1.003 - 1.029       POC Leukocytes, Urine       Negative          Negative             ----- Message -----  From: Iman Kwok MA  Sent: 3/16/2024   3:03 PM CDT  To: #

## 2024-03-20 NOTE — TELEPHONE ENCOUNTER
S/w pt - informed her of negative urine cx.   
PROVIDER:[TOKEN:[[id number]],FOLLOWUP:[1-3 Days],ESTABLISHEDPATIENT:[T]]

## 2024-08-28 ENCOUNTER — TELEPHONE (OUTPATIENT)
Dept: OPHTHALMOLOGY | Facility: CLINIC | Age: 44
End: 2024-08-28
Payer: COMMERCIAL

## 2024-08-28 NOTE — TELEPHONE ENCOUNTER
----- Message from Deidre Warren MA sent at 8/27/2024  3:41 PM CDT -----  Contact: [phone]    ----- Message -----  From: Brett uK  Sent: 8/27/2024   3:24 PM CDT  To: [facility] Clinical Support Staff      Type:  Patient Referral Inquiry    Who Called:Pt  Referral to What Specialty:Ophthalmology  Reason for Referral:Blood behind her eyes  Would the patient rather a call back or a response via [facility]? Call Back  Best Call Back Number:[phone]  Additional Information: Pt trying to see if her referral was received, may have been sent from a Dr. Romero

## 2024-08-28 NOTE — TELEPHONE ENCOUNTER
Reaching out to patient to get her scheduled, pt's phone goes straight to voicemail. Left message on machine to call back to get scheduled. Referral is in .   -Mele

## 2025-04-01 ENCOUNTER — TELEPHONE (OUTPATIENT)
Dept: PHARMACY | Facility: CLINIC | Age: 45
End: 2025-04-01
Payer: COMMERCIAL

## 2025-04-01 NOTE — TELEPHONE ENCOUNTER
[facility] Chart Review & Patient Outreach Details For Medication Adherence Project    Reason for Outreach Encounter: 3rd Party payor non-compliance report ([employer], [employer], C, etc)  2.  Patient Outreach Method: Reviewed Patient Chart  3.   Medication in question: atorvastatin   LAST FILLED: 2/24/25 for 90 day supply  Hyperlipidemia Medications              atorvastatin (LIPITOR) 20 MG tablet Take 20 mg by mouth.               4.  Reviewed and or Updates Made To: Patient Chart  5. Outreach Outcomes and/or actions taken: Patient filled medication and is on track to be adherent

## 2025-04-03 ENCOUNTER — TELEPHONE (OUTPATIENT)
Dept: PHARMACY | Facility: CLINIC | Age: 45
End: 2025-04-03
Payer: COMMERCIAL

## 2025-04-03 NOTE — TELEPHONE ENCOUNTER
[facility] Chart Review & Patient Outreach Details For Medication Adherence Project    Reason for Outreach Encounter: 3rd Party payor non-compliance report ([employer], [employer], C, etc)  2.  Patient Outreach Method: Reviewed Patient Chart  3.   Medication in question: lotrel   LAST FILLED: 1/14/25 for 90 day supply  Hypertension Medications              amlodipine-benazepril 10-20mg (LOTREL) 10-20 mg per capsule     hydrochlorothiazide (HYDRODIURIL) 25 MG tablet               4.  Reviewed and or Updates Made To: Patient Chart  5. Outreach Outcomes and/or actions taken: Patient filled medication and is on track to be adherent